# Patient Record
Sex: FEMALE | Race: WHITE | NOT HISPANIC OR LATINO | Employment: UNEMPLOYED | URBAN - METROPOLITAN AREA
[De-identification: names, ages, dates, MRNs, and addresses within clinical notes are randomized per-mention and may not be internally consistent; named-entity substitution may affect disease eponyms.]

---

## 2017-08-31 ENCOUNTER — CONVERSION ENCOUNTER (OUTPATIENT)
Dept: GENERAL RADIOLOGY | Facility: HOSPITAL | Age: 57
End: 2017-08-31

## 2018-03-06 ENCOUNTER — OFFICE VISIT CONVERTED (OUTPATIENT)
Dept: ONCOLOGY | Facility: HOSPITAL | Age: 58
End: 2018-03-06
Attending: INTERNAL MEDICINE

## 2018-03-06 ENCOUNTER — OFFICE VISIT CONVERTED (OUTPATIENT)
Dept: FAMILY MEDICINE CLINIC | Facility: CLINIC | Age: 58
End: 2018-03-06
Attending: FAMILY MEDICINE

## 2018-04-10 ENCOUNTER — OFFICE VISIT CONVERTED (OUTPATIENT)
Dept: FAMILY MEDICINE CLINIC | Facility: CLINIC | Age: 58
End: 2018-04-10
Attending: FAMILY MEDICINE

## 2018-04-10 ENCOUNTER — CONVERSION ENCOUNTER (OUTPATIENT)
Dept: FAMILY MEDICINE CLINIC | Facility: CLINIC | Age: 58
End: 2018-04-10

## 2018-04-18 ENCOUNTER — OFFICE VISIT CONVERTED (OUTPATIENT)
Dept: ORTHOPEDIC SURGERY | Facility: CLINIC | Age: 58
End: 2018-04-18
Attending: ORTHOPAEDIC SURGERY

## 2018-05-09 ENCOUNTER — OFFICE VISIT CONVERTED (OUTPATIENT)
Dept: ORTHOPEDIC SURGERY | Facility: CLINIC | Age: 58
End: 2018-05-09
Attending: ORTHOPAEDIC SURGERY

## 2018-05-15 ENCOUNTER — OFFICE VISIT CONVERTED (OUTPATIENT)
Dept: NEUROLOGY | Facility: CLINIC | Age: 58
End: 2018-05-15
Attending: PSYCHIATRY & NEUROLOGY

## 2018-08-28 ENCOUNTER — OFFICE VISIT CONVERTED (OUTPATIENT)
Dept: FAMILY MEDICINE CLINIC | Facility: CLINIC | Age: 58
End: 2018-08-28
Attending: FAMILY MEDICINE

## 2019-05-20 ENCOUNTER — HOSPITAL ENCOUNTER (OUTPATIENT)
Dept: URGENT CARE | Facility: CLINIC | Age: 59
Discharge: HOME | End: 2019-05-20

## 2019-05-22 LAB — BACTERIA UR CULT: NORMAL

## 2019-08-21 ENCOUNTER — OFFICE VISIT CONVERTED (OUTPATIENT)
Dept: FAMILY MEDICINE CLINIC | Facility: CLINIC | Age: 59
End: 2019-08-21
Attending: NURSE PRACTITIONER

## 2019-08-21 ENCOUNTER — HOSPITAL ENCOUNTER (OUTPATIENT)
Dept: FAMILY MEDICINE CLINIC | Facility: CLINIC | Age: 59
Discharge: HOME | End: 2019-08-21
Attending: FAMILY MEDICINE

## 2019-08-21 LAB
ALBUMIN SERPL-MCNC: 4.4 G/DL (ref 3.5–5)
ALBUMIN/GLOB SERPL: 1.3 {RATIO} (ref 1.4–2.6)
ALP SERPL-CCNC: 102 U/L (ref 53–141)
ALT SERPL-CCNC: 15 U/L (ref 10–40)
ANION GAP SERPL CALC-SCNC: 22 MMOL/L (ref 8–19)
AST SERPL-CCNC: 15 U/L (ref 15–50)
BASOPHILS # BLD AUTO: 0.06 10*3/UL (ref 0–0.2)
BASOPHILS NFR BLD AUTO: 0.5 % (ref 0–3)
BILIRUB SERPL-MCNC: 0.24 MG/DL (ref 0.2–1.3)
BUN SERPL-MCNC: 13 MG/DL (ref 5–25)
BUN/CREAT SERPL: 17 {RATIO} (ref 6–20)
CALCIUM SERPL-MCNC: 9.7 MG/DL (ref 8.7–10.4)
CHLORIDE SERPL-SCNC: 101 MMOL/L (ref 99–111)
CHOLEST SERPL-MCNC: 207 MG/DL (ref 107–200)
CHOLEST/HDLC SERPL: 3.1 {RATIO} (ref 3–6)
CONV ABS IMM GRAN: 0.07 10*3/UL (ref 0–0.2)
CONV CO2: 22 MMOL/L (ref 22–32)
CONV IMMATURE GRAN: 0.6 % (ref 0–1.8)
CONV TOTAL PROTEIN: 7.7 G/DL (ref 6.3–8.2)
CREAT UR-MCNC: 0.77 MG/DL (ref 0.5–0.9)
DEPRECATED RDW RBC AUTO: 42.5 FL (ref 36.4–46.3)
EOSINOPHIL # BLD AUTO: 0.23 10*3/UL (ref 0–0.7)
EOSINOPHIL # BLD AUTO: 2 % (ref 0–7)
ERYTHROCYTE [DISTWIDTH] IN BLOOD BY AUTOMATED COUNT: 13 % (ref 11.7–14.4)
EST. AVERAGE GLUCOSE BLD GHB EST-MCNC: 128 MG/DL
GFR SERPLBLD BASED ON 1.73 SQ M-ARVRAT: >60 ML/MIN/{1.73_M2}
GLOBULIN UR ELPH-MCNC: 3.3 G/DL (ref 2–3.5)
GLUCOSE SERPL-MCNC: 149 MG/DL (ref 65–99)
HBA1C MFR BLD: 6.1 % (ref 3.5–5.7)
HCT VFR BLD AUTO: 44.9 % (ref 37–47)
HDLC SERPL-MCNC: 66 MG/DL (ref 40–60)
HGB BLD-MCNC: 14 G/DL (ref 12–16)
LDLC SERPL CALC-MCNC: 108 MG/DL (ref 70–100)
LYMPHOCYTES # BLD AUTO: 3.01 10*3/UL (ref 1–5)
LYMPHOCYTES NFR BLD AUTO: 26.1 % (ref 20–45)
MCH RBC QN AUTO: 28.2 PG (ref 27–31)
MCHC RBC AUTO-ENTMCNC: 31.2 G/DL (ref 33–37)
MCV RBC AUTO: 90.3 FL (ref 81–99)
MONOCYTES # BLD AUTO: 0.59 10*3/UL (ref 0.2–1.2)
MONOCYTES NFR BLD AUTO: 5.1 % (ref 3–10)
NEUTROPHILS # BLD AUTO: 7.59 10*3/UL (ref 2–8)
NEUTROPHILS NFR BLD AUTO: 65.7 % (ref 30–85)
NRBC CBCN: 0 % (ref 0–0.7)
OSMOLALITY SERPL CALC.SUM OF ELEC: 295 MOSM/KG (ref 273–304)
PLATELET # BLD AUTO: 312 10*3/UL (ref 130–400)
PMV BLD AUTO: 11.8 FL (ref 9.4–12.3)
POTASSIUM SERPL-SCNC: 4.3 MMOL/L (ref 3.5–5.3)
RBC # BLD AUTO: 4.97 10*6/UL (ref 4.2–5.4)
SODIUM SERPL-SCNC: 141 MMOL/L (ref 135–147)
TRIGL SERPL-MCNC: 166 MG/DL (ref 40–150)
TSH SERPL-ACNC: 1.56 M[IU]/L (ref 0.27–4.2)
VLDLC SERPL-MCNC: 33 MG/DL (ref 5–37)
WBC # BLD AUTO: 11.55 10*3/UL (ref 4.8–10.8)

## 2020-09-01 ENCOUNTER — CONVERSION ENCOUNTER (OUTPATIENT)
Dept: FAMILY MEDICINE CLINIC | Facility: CLINIC | Age: 60
End: 2020-09-01

## 2020-09-01 ENCOUNTER — HOSPITAL ENCOUNTER (OUTPATIENT)
Dept: FAMILY MEDICINE CLINIC | Facility: CLINIC | Age: 60
Discharge: HOME | End: 2020-09-01
Attending: FAMILY MEDICINE

## 2020-09-01 ENCOUNTER — OFFICE VISIT CONVERTED (OUTPATIENT)
Dept: FAMILY MEDICINE CLINIC | Facility: CLINIC | Age: 60
End: 2020-09-01
Attending: FAMILY MEDICINE

## 2020-09-01 LAB
EST. AVERAGE GLUCOSE BLD GHB EST-MCNC: 140 MG/DL
HBA1C MFR BLD: 6.5 % (ref 3.5–5.7)

## 2020-09-02 LAB
ALBUMIN SERPL-MCNC: 4 G/DL (ref 3.5–5)
ALBUMIN/GLOB SERPL: 1.3 {RATIO} (ref 1.4–2.6)
ALP SERPL-CCNC: 102 U/L (ref 53–141)
ALT SERPL-CCNC: 20 U/L (ref 10–40)
ANION GAP SERPL CALC-SCNC: 19 MMOL/L (ref 8–19)
AST SERPL-CCNC: 20 U/L (ref 15–50)
BILIRUB SERPL-MCNC: 0.32 MG/DL (ref 0.2–1.3)
BUN SERPL-MCNC: 14 MG/DL (ref 5–25)
BUN/CREAT SERPL: 17 {RATIO} (ref 6–20)
CALCIUM SERPL-MCNC: 9.8 MG/DL (ref 8.7–10.4)
CHLORIDE SERPL-SCNC: 103 MMOL/L (ref 99–111)
CONV CO2: 23 MMOL/L (ref 22–32)
CONV TOTAL PROTEIN: 7.2 G/DL (ref 6.3–8.2)
CREAT UR-MCNC: 0.82 MG/DL (ref 0.5–0.9)
GFR SERPLBLD BASED ON 1.73 SQ M-ARVRAT: >60 ML/MIN/{1.73_M2}
GLOBULIN UR ELPH-MCNC: 3.2 G/DL (ref 2–3.5)
GLUCOSE SERPL-MCNC: 157 MG/DL (ref 65–99)
OSMOLALITY SERPL CALC.SUM OF ELEC: 296 MOSM/KG (ref 273–304)
POTASSIUM SERPL-SCNC: 4.2 MMOL/L (ref 3.5–5.3)
SODIUM SERPL-SCNC: 141 MMOL/L (ref 135–147)
TSH SERPL-ACNC: 2.15 M[IU]/L (ref 0.27–4.2)

## 2020-10-01 ENCOUNTER — TELEPHONE CONVERTED (OUTPATIENT)
Dept: FAMILY MEDICINE CLINIC | Facility: CLINIC | Age: 60
End: 2020-10-01
Attending: FAMILY MEDICINE

## 2021-04-27 ENCOUNTER — OFFICE VISIT CONVERTED (OUTPATIENT)
Dept: FAMILY MEDICINE CLINIC | Facility: CLINIC | Age: 61
End: 2021-04-27
Attending: FAMILY MEDICINE

## 2021-04-27 ENCOUNTER — HOSPITAL ENCOUNTER (OUTPATIENT)
Dept: FAMILY MEDICINE CLINIC | Facility: CLINIC | Age: 61
Discharge: HOME | End: 2021-04-27
Attending: FAMILY MEDICINE

## 2021-04-27 LAB
ALBUMIN SERPL-MCNC: 4.5 G/DL (ref 3.5–5)
ALBUMIN/GLOB SERPL: 1.3 {RATIO} (ref 1.4–2.6)
ALP SERPL-CCNC: 98 U/L (ref 53–141)
ALT SERPL-CCNC: 27 U/L (ref 10–40)
ANION GAP SERPL CALC-SCNC: 17 MMOL/L (ref 8–19)
AST SERPL-CCNC: 26 U/L (ref 15–50)
BILIRUB SERPL-MCNC: 0.33 MG/DL (ref 0.2–1.3)
BUN SERPL-MCNC: 15 MG/DL (ref 5–25)
BUN/CREAT SERPL: 17 {RATIO} (ref 6–20)
CALCIUM SERPL-MCNC: 9.9 MG/DL (ref 8.7–10.4)
CHLORIDE SERPL-SCNC: 101 MMOL/L (ref 99–111)
CONV CO2: 27 MMOL/L (ref 22–32)
CONV TOTAL PROTEIN: 7.9 G/DL (ref 6.3–8.2)
CREAT UR-MCNC: 0.88 MG/DL (ref 0.5–0.9)
EST. AVERAGE GLUCOSE BLD GHB EST-MCNC: 143 MG/DL
GFR SERPLBLD BASED ON 1.73 SQ M-ARVRAT: >60 ML/MIN/{1.73_M2}
GLOBULIN UR ELPH-MCNC: 3.4 G/DL (ref 2–3.5)
GLUCOSE SERPL-MCNC: 159 MG/DL (ref 65–99)
HBA1C MFR BLD: 6.6 % (ref 3.5–5.7)
OSMOLALITY SERPL CALC.SUM OF ELEC: 296 MOSM/KG (ref 273–304)
POTASSIUM SERPL-SCNC: 4 MMOL/L (ref 3.5–5.3)
SODIUM SERPL-SCNC: 141 MMOL/L (ref 135–147)
T4 FREE SERPL-MCNC: 1.4 NG/DL (ref 0.9–1.8)
TSH SERPL-ACNC: 1.3 M[IU]/L (ref 0.27–4.2)

## 2021-05-06 ENCOUNTER — HOSPITAL ENCOUNTER (OUTPATIENT)
Dept: FAMILY MEDICINE CLINIC | Facility: CLINIC | Age: 61
Discharge: HOME | End: 2021-05-06
Attending: FAMILY MEDICINE

## 2021-05-08 LAB — BACTERIA UR CULT: NORMAL

## 2021-05-13 NOTE — PROGRESS NOTES
Progress Note      Patient Name: Frida Huff   Patient ID: [id number]   Sex: Female   YOB: 1960    Primary Care Provider: Edel Woodson MD   Referring Provider: Edel Woodson MD    Visit Date: October 1, 2020    Provider: Edel Woodson MD   Location: [facility]   Location Address: [address] [place] [address], [address] [place] [address]  [id number]   Location Phone: [phone]          Chief Complaint  · 1 Month Follow Up      History Of Present Illness  TELEHEALTH TELEPHONE VISIT  Frida Huff is a 60 year old /White female who is presenting for evaluation via telehealth telephone visit. Verbal consent obtained before beginning visit.   Provider spent 25 minutes with patient during telehealth visit.   The following staff were present during this visit: Nancy Chavez   Past Medical History/Overview of Patient Symptoms     Chronic Pain- pt reports the tramadol does effect her ears.  Pt reports the gabapentin helps the pain between her shoulder blades feel better but she could not focus her eyes on anything. Pt reports pain is tolerable at this time.     Diabetes- pt is still taking her Jardiance and at this time refuses to go to any other specialist but her A1C was well controlled at 6.5 but I am concerned it is so well controlled because she is not eating well.     Eustachian tube disorderpatient reports that the ofloxacin drops that she had from her last appointment did not work well  Made her ears feel more clogged up.  Patient reports that after she finished using the eardrops her ears that she started to feel better but now feels at this time they clogged again.  I will be sending her prescription for nasal spray Flonase that she can use to hopefully decrease some of the inflammation in her nasal passages and may be releasing pressure from the eustachian tube.             Past Medical History  Adhesive capsulitis of left shoulder; Allergies; Anemia; Anxiety; Chest  pain; Colitis; Deafness; Depression; Diabetes; Diabetes mellitus, type 2; Essential hypertension; Hemorrhoids; Hypertension; Hypothyroidism; Kidney stones; MGUS (monoclonal gammopathy of unknown significance); Migraines; Night sweat; Numbness; Sinus trouble; SOB (shortness of breath); Thyroid disorder; Thyroid Problems         Past Surgical History  Appendectomy; heart surgery; Hysterectomy; Kidney; Kidney Stone Surgery, Unspecified         Medication List  Aspir-81 81 mg oral tablet,delayed release (DR/EC); clindamycin HCl 150 mg oral capsule; clobetasol 0.05 % topical cream; doxycycline monohydrate 100 mg oral capsule; estradiol 1 mg oral tablet; FreeStyle Lancets 28 gauge miscellaneous misc; FreeStyle Lite Meter miscellaneous kit; FreeStyle Lite Strips miscellaneous strip; levothyroxine 88 mcg oral tablet; lisinopril-hydrochlorothiazide 10-12.5 mg oral tablet; melatonin 1 mg oral tablet; metformin 500 mg oral tablet; ofloxacin 0.3 % otic (ear) drops; tramadol 50 mg oral tablet; Vitamin D3 5,000 unit oral tablet         Allergy List  Contrast media allergy; Latex Exam Gloves; levofloxacin; Neosporin; SULFA (SULFONAMIDES)         Family Medical History  Stroke; Heart Disease; Cancer, Unspecified; Diabetes, unspecified type; Diabetes Mellitus, Type II; Family history of certain chronic disabling diseases; arthritis         Reproductive History   0 Para 0 0 0 0       Social History  Alcohol Use; Homemaker.; .; Recreational Drug Use (Never); Tobacco (Never)         Review of Systems  · Constitutional  o Denies  o : fatigue, fever, weight loss, weight gain  · Eyes  o Denies  o : eye discomfort, eye pain  · HENT  o Admits  o : ear fullness  o Denies  o : vertigo, nasal congestion  · Genitourinary  o Denies  o : frequency, dysuria  · Integument  o Denies  o : rash, itching  · Neurologic  o Denies  o : muscular weakness, memory difficulties  · Musculoskeletal  o Denies  o : joint swelling, muscle  pain  · Psychiatric  o Denies  o : anxiety, depression  · Heme-Lymph  o Denies  o : lightheadedness, easy bleeding  · Allergic-Immunologic  o Admits  o : sinus allergy symptoms  o Denies  o : allergic dermatitis          Assessment  · Allergic rhinitis due to allergen     477.9/J30.9  · Diabetes mellitus, type 2     250.00/E11.9  · Chronic back pain       Dorsalgia, unspecified     724.5/M54.9  Other chronic pain     724.5/G89.29  · Eustachian tube disorder     381.9/H69.90      Plan  · Orders  o CMP [facility] ([id number]) - 250.00/E11.9 - 03/01/2021 - - (Future Order)  o Hgb A1c [facility] ([id number]) - 250.00/E11.9 - 03/01/2021 - - (Future Order)  o ACO-39: Current medications updated and reviewed (, 1159F) - - 10/01/2020  o Physician Telephone Evaluation, 21-30 minutes ([id number]) - 477.9/J30.9 - 10/01/2020  · Medications  o fluticasone propionate 50 mcg/actuation nasal spray,suspension   SIG: spray 1 - 2 sprays in each nostril by intranasal route once daily   DISP: (1) Bottle with 11 refills  Prescribed on 10/01/2020     o clobetasol 0.05 % topical ointment   SIG: apply a thin layer to the affected area(s) by topical route 3 times per day for 30 days   DISP: (1) Tube with 5 refills  Adjusted on 10/01/2020     o FreeStyle Lite Strips miscellaneous strip   SIG: check blood sugar TID   DISP: (100) Each with 11 refills  Adjusted on 10/01/2020     o Medications have been Reconciled  o Transition of Care or Provider Policy  · Instructions  o Plan Of Care:   o Chronic conditions reviewed and taken into consideration for today's treatment plan.  o Discussed Covid-19 precautions including, but not limited to, social distancing, avoid touching your face, and hand washing.   · Disposition  o Return Visit Request in/on 5 months +/- 0 days ([id number]).            Electronically Signed by: Edel Woodson MD -Author on October 1, 2020 04:18:57 PM

## 2021-05-13 NOTE — PROGRESS NOTES
Progress Note      Patient Name: Frida Huff   Patient ID: [id number]   Sex: Female   YOB: 1960    Primary Care Provider: Edel Woodson MD   Referring Provider: Edel Woodson MD    Visit Date: September 1, 2020    Provider: Edel Woodson MD   Location: [facility]   Location Address: [address] [place] [address], [address] [place] [address]  [id number]   Location Phone: [phone]          Chief Complaint  · Annual Physical Exam      History Of Present Illness  Frida Huff is a 60 year old /White female who presents for evaluation and treatment of:      Pt is here for her annual physical exam and has been lost to follow up for Diabetes.     Otitis Externa- Pt had a sinus infection from 6 weeks ago and did not go to the doctor and has blood coming out of her left ear.     Skin laceration-Pt had a cut on her left ankle from lancet she left on the bed.  That occurred about 6 weeks ago.     Dm type - highest number is 137.  Pt  reports that she has been suffering from low blood sugars with the lowest being seen in the 50s.  Patient has not been eating well and her  says that she does not monitor all and can go all day long barely eating anything.  Patient reports that she often forgets to eat because she is playing video games.  Patient reports that she did start to feel shaky now since she has not had much to eat since lunch.  I will be getting her labs today and according to her A1c numbers I will be adjusting her medications or even discontinuing her metformin.    Chronic painpatient has been suffering from chronic pain due to back and multiple health issues.  Patient only takes tramadol 1 or 2 days out of the month but reports that she is in constant pain every day with sharp stabbing pain in her back.  We discussed different options for pain control and taking the tramadol can sometimes make her little tired but it does work well for her pain.  Patient is  concerned about a possible affecting her hearing.  We discussed trying an alternative such as gabapentin 100 mg once a day relieving at night to see if that will help control her pain better start giving her drowsiness on long-term complications.  It is something that she is going to try and we will do a 1 month follow-up to see how well she still with her pain control.  Patient does not want to see multiple specialist for take action on her back problem at this time.    Skin rashpatient has a recurrent skin rash that the clobetasol cream seems to work well for her.  I will be giving her a refill for that.       Past Medical History  Adhesive capsulitis of left shoulder; Allergies; Anemia; Anxiety; Chest pain; Colitis; Deafness; Depression; Diabetes; Diabetes mellitus, type 2; Essential hypertension; Hemorrhoids; Hypertension; Hypothyroidism; Kidney stones; MGUS (monoclonal gammopathy of unknown significance); Migraines; Night sweat; Numbness; Sinus trouble; SOB (shortness of breath); Thyroid disorder; Thyroid Problems         Past Surgical History  Appendectomy; heart surgery; Hysterectomy; Kidney; Kidney Stone Surgery, Unspecified         Medication List  Aspir-81 81 mg oral tablet,delayed release (DR/EC); estradiol 1 mg oral tablet; FreeStyle Lancets 28 gauge miscellaneous misc; FreeStyle Lite Meter miscellaneous kit; FreeStyle Lite Strips miscellaneous strip; levothyroxine 88 mcg oral tablet; lisinopril-hydrochlorothiazide 10-12.5 mg oral tablet; melatonin 1 mg oral tablet; metformin 500 mg oral tablet; Vitamin D3 5,000 unit oral tablet         Allergy List  Contrast media allergy; Latex Exam Gloves; levofloxacin; Neosporin; SULFA (SULFONAMIDES)         Family Medical History  Stroke; Heart Disease; Cancer, Unspecified; Diabetes, unspecified type; Diabetes Mellitus, Type II; Family history of certain chronic disabling diseases; arthritis         Reproductive History   0 Para 0 0 0 0       Social  "History  Alcohol Use; Homemaker.; .; Recreational Drug Use (Never); Tobacco (Never)         Review of Systems  · Constitutional  o Denies  o : fatigue, fever, weight loss, weight gain  · Eyes  o Denies  o : eye discomfort, eye pain  · HENT  o Denies  o : vertigo, nasal congestion  · Cardiovascular  o Admits  o : irregular heart beats, lower extremity edema  · Respiratory  o Denies  o : shortness of breath, wheezing  · Gastrointestinal  o Denies  o : nausea, vomiting  · Genitourinary  o Denies  o : frequency, dysuria  · Integument  o Admits  o : rash, itching  · Neurologic  o Denies  o : muscular weakness, memory difficulties  · Musculoskeletal  o Admits  o : joint pain  o Denies  o : joint swelling, muscle pain  · Psychiatric  o Denies  o : anxiety, depression  · Heme-Lymph  o Denies  o : lightheadedness, easy bleeding  · Allergic-Immunologic  o Denies  o : sinus allergy symptoms, allergic dermatitis      Vitals  Date Time BP Position Site L\R Cuff Size HR RR TEMP (F) WT  HT  BMI kg/m2 BSA m2 O2 Sat        09/01/2020 05:38 /100 Sitting    107 - R 12 97.3 257lbs 0oz 5'  6\" 41.48 2.33 98 %          Physical Examination  · Constitutional  o Appearance  o : well-nourished, in no acute distress  · Eyes  o Conjunctivae  o : conjunctivae normal  o Sclerae  o : sclerae white  o Pupils and Irises  o : pupils equal, round, and reactive to light and accommodation bilaterally  o Eyelids/Ocular Adnexae  o : eyelid appearance normal, no exudates present  · Ears, Nose, Mouth and Throat  o Ears  o :   § External Ears  § : external auditory canal appearance within normal limits, no discharge present  § Otoscopic Examination  § : tympanic membrane appearance within normal limits bilaterally  o Nose  o :   § External Nose  § : appearance normal  § Nasopharynx  § : no discharge present  o Oral Cavity  o :   § Oral Mucosa  § : oral mucosa normal  § Lips  § : lip appearance normal  o Throat  o :   § Oropharynx  § : no " inflammation or lesions present, tonsils within normal limits  · Neck  o Range of Motion  o : cervical range of motion within normal limits  · Respiratory  o Respiratory Effort  o : breathing unlabored  o Inspection of Chest  o : normal appearance  o Auscultation of Lungs  o : normal breath sounds throughout  · Cardiovascular  o Heart  o :   § Auscultation of Heart  § : regular rate and rhythm, no murmurs, gallops or rubs  o Peripheral Vascular System  o :   § Extremities  § : 2+ edema present, no cyanosis, no distal hair loss, normal capillary refill  · Gastrointestinal  o Abdominal Examination  o : abdomen nontender to palpation, tone normal without rigidity or guarding, no masses present, bowel sounds present in all four quadrants  · Lymphatic  o Neck  o : no lymphadenopathy present  · Musculoskeletal  o Spine  o :   § Inspection/Palpation  § : no spinal tenderness, scoliosis or kyphosis present  § Stability  § : no subluxations present  § Range of Motion  § : spine range of motion normal  o Right Upper Extremity  o :   § Inspection/Palpation  § : no tenderness to palpation, ROM normal  o Left Upper Extremity  o :   § Inspection/Palpation  § : no tenderness to palpation, ROM normal  o Right Lower Extremity  o :   § Inspection/Palpation  § : no joint or limb tenderness to palpation, ROM normal  o Left Lower Extremity  o :   § Inspection/Palpation  § : no joint or limb tenderness to palpation, ROM normal  · Skin and Subcutaneous Tissue  o General Inspection  o : no rashes or lesions present, no areas of discoloration  o Body Hair  o : scalp palpation normal, hair normal for age, general body hair distribution normal for age  o Digits and Nails  o : no clubbing, cyanosis, deformities or edema present, normal appearing nails  · Neurologic  o Mental Status Examination  o :   § Orientation  § : grossly oriented to person, place and time  o Gait and Station  o : normal gait, able to stand without  difficulty  · Psychiatric  o Judgement and Insight  o : judgment and insight intact  o Mood and Affect  o : mood normal, affect appropriate          Assessment  · Diabetes mellitus, type 2     250.00/E11.9  · Essential hypertension     401.9/I10  · Hypothyroidism     244.9/E03.9  · Lumbago     724.2/M54.5  · Otitis externa     380.10/H60.90    Problems Reconciled  Plan  · Orders  o CMP [facility] ([id number]) - 250.00/E11.9 - 09/01/2020  o Hgb A1c [facility] ([id number]) - 250.00/E11.9 - 09/01/2020  o TSH [facility] ([id number]) - 244.9/E03.9 - 09/01/2020  o ACO-39: Current medications updated and reviewed () - - 09/01/2020  · Medications  o clobetasol 0.05 % topical cream   SIG: apply a thin layer to the affected area(s) by topical route 3 times per day for 30 days   DISP: (1) 60 gm tube with 2 refills  Prescribed on 09/01/2020     o tramadol 50 mg oral tablet   SIG: take 1 tablet by oral route 3 times a day as needed for 30 days   DISP: (90) tablets with 2 refills  Prescribed on 09/01/2020     o ofloxacin 0.3 % otic (ear) drops   SIG: instill 10 drops into left ear by otic route once daily for 10 days   DISP: (1) 5 ml drop btl with 0 refills  Prescribed on 09/01/2020     o lisinopril-hydrochlorothiazide 10-12.5 mg oral tablet   SIG: take 1 tablet by oral route once daily for 30 days   DISP: (30) tablets with 2 refills  Adjusted on 09/01/2020     o levothyroxine 88 mcg oral tablet   SIG: take 1 tablet (88 mcg) by oral route once daily for 90 days   DISP: (90) tablets with 1 refills  Refilled on 09/01/2020     o metformin 500 mg oral tablet   SIG: take 1 tablet (500 mg) by oral route 2 times per day with morning and evening meals for 90 days   DISP: (180) tablets with 1 refills  Refilled on 09/01/2020     o Medications have been Reconciled  o Transition of Care or Provider Policy  · Instructions  o Patient was educated/instructed on their diagnosis, treatment and medications prior to discharge from the clinic today.  o Minutes spent with patient  including greater than 50% in Education/Counseling/Care Coordination.  o Time spent with the patient was minutes, more than 50% face to face. 25 minutes  · Disposition  o Return Visit Request in/on 1 month +/- 0 days ([id number]).            Electronically Signed by: Edel Woodson MD -Author on September 1, 2020 05:40:24 PM

## 2021-05-14 VITALS
WEIGHT: 257 LBS | SYSTOLIC BLOOD PRESSURE: 155 MMHG | BODY MASS INDEX: 41.3 KG/M2 | DIASTOLIC BLOOD PRESSURE: 100 MMHG | TEMPERATURE: 97.3 F | RESPIRATION RATE: 12 BRPM | HEART RATE: 107 BPM | HEIGHT: 66 IN | OXYGEN SATURATION: 98 %

## 2021-05-14 VITALS
DIASTOLIC BLOOD PRESSURE: 88 MMHG | BODY MASS INDEX: 39.41 KG/M2 | HEIGHT: 66 IN | HEART RATE: 98 BPM | OXYGEN SATURATION: 96 % | WEIGHT: 245.25 LBS | SYSTOLIC BLOOD PRESSURE: 150 MMHG | TEMPERATURE: 98.2 F

## 2021-05-14 NOTE — PROGRESS NOTES
Progress Note      Patient Name: Frida Huff   Patient ID: [id number]   Sex: Female   YOB: 1960    Primary Care Provider: Edel Woodson MD   Referring Provider: Edel Woodson MD    Visit Date: April 27, 2021    Provider: Edel Woodson MD   [place] [facility] [place]   [place] Address: [address] [place] [address], [address] [place] [address]  [id number]   Location Phone: [phone]          Chief Complaint  · Diabetes Mellitus Type 2 follow up      History Of Present Illness  Frida Huff is a 60 year old /White female who presents for evaluation and treatment of:      Depression- patient reports that she did not want to do her depression screen since she is lost multiple family members with Covid recently in the last few months.  Pt refused to do it at this time.    Falls- pt reports she fell at home and has some pain in her hips.  Pt reports she did not go to the ER.     DM type 2- pt last Hgb A1C was 6.5 in Sept 2020 and that was well controlled.  pt doesn't think it will be as good since she has not been completely compliant with her diet and all the family and friends dying of COVID.  I will be rechecking her numbers today.     Pt has follow up coming up with Hematology.               Past Medical History  Adhesive capsulitis of left shoulder; Allergies; Anemia; Anxiety; Chest pain; Colitis; Deafness; Depression; Diabetes; Diabetes mellitus, type 2; Essential hypertension; Hemorrhoids; Hypertension; Hypothyroidism; Kidney Stones; MGUS (monoclonal gammopathy of unknown significance); Migraines; Night sweat; Numbness; Sinus trouble; SOB (shortness of breath); Thyroid disorder; Thyroid Problems         Past Surgical History  Appendectomy; heart surgery; Hysterectomy; Kidney; Kidney Stone Surgery, Unspecified         Medication List  Aspir-81 81 mg oral tablet,delayed release (DR/EC); clobetasol 0.05 % topical ointment; cranberry extract 500 mg oral tablet; estradiol 1 mg oral  "tablet; fluticasone propionate 50 mcg/actuation nasal spray,suspension; FreeStyle Lancets 28 gauge miscellaneous misc; FreeStyle Lite Meter miscellaneous kit; FreeStyle Lite Strips miscellaneous strip; levothyroxine 88 mcg oral tablet; lisinopril-hydrochlorothiazide 10-12.5 mg oral tablet; melatonin 1 mg oral tablet; metformin 500 mg oral tablet; tramadol 50 mg oral tablet; Vitamin D3 5,000 unit oral tablet         Allergy List  Contrast media allergy; Latex Exam Gloves; levofloxacin; Neosporin; SULFA (SULFONAMIDES)       Allergies Reconciled  Family Medical History  Stroke; Heart Disease; Cancer, Unspecified; Diabetes, unspecified type; Diabetes Mellitus, Type II; Family history of certain chronic disabling diseases; arthritis         Reproductive History   0 Para 0 0 0 0       Social History  Alcohol Use; Homemaker.; .; Recreational Drug Use (Never); Tobacco (Never)         Review of Systems  · Constitutional  o Denies  o : fatigue, fever, weight loss, weight gain  · Eyes  o Denies  o : eye discomfort, eye pain  · HENT  o Denies  o : vertigo, nasal congestion  · Cardiovascular  o Denies  o : chest pain, irregular heart beats  · Respiratory  o Denies  o : shortness of breath, wheezing  · Gastrointestinal  o Denies  o : nausea, vomiting  · Genitourinary  o Denies  o : frequency, dysuria  · Integument  o Denies  o : rash, itching  · Neurologic  o Denies  o : muscular weakness, memory difficulties  · Musculoskeletal  o Denies  o : joint swelling, muscle pain  · Psychiatric  o Denies  o : anxiety, depression  · Heme-Lymph  o Denies  o : lightheadedness, easy bleeding  · Allergic-Immunologic  o Denies  o : sinus allergy symptoms, allergic dermatitis      Vitals  Date Time BP Position Site L\R Cuff Size HR RR TEMP (F) WT  HT  BMI kg/m2 BSA m2 O2 Sat FR L/min FiO2        2021 02:48 /88 Sitting    98 - R  98.2 245lbs 4oz 5'  6\" 39.58 2.28 96 %            Physical " Examination  · Constitutional  o Appearance  o : well-nourished, in no acute distress  · Eyes  o Conjunctivae  o : conjunctivae normal  o Sclerae  o : sclerae white  o Pupils and Irises  o : pupils equal, round, and reactive to light and accommodation bilaterally  o Eyelids/Ocular Adnexae  o : eyelid appearance normal, no exudates present  · Ears, Nose, Mouth and Throat  o Ears  o :   § External Ears  § : external auditory canal appearance within normal limits, no discharge present  § Otoscopic Examination  § : tympanic membrane appearance within normal limits bilaterally  o Nose  o :   § External Nose  § : appearance normal  § Nasopharynx  § : no discharge present  o Oral Cavity  o :   § Oral Mucosa  § : oral mucosa normal  § Lips  § : lip appearance normal  o Throat  o :   § Oropharynx  § : no inflammation or lesions present, tonsils within normal limits  · Neck  o Inspection/Palpation  o : normal appearance, no masses or tenderness, trachea midline  o Range of Motion  o : cervical range of motion within normal limits  o Thyroid  o : gland size normal, nontender, no nodules or masses present on palpation  o Jugular Veins  o : JVP normal  · Respiratory  o Respiratory Effort  o : breathing unlabored  o Inspection of Chest  o : normal appearance  o Auscultation of Lungs  o : normal breath sounds throughout  · Cardiovascular  o Heart  o :   § Auscultation of Heart  § : regular rate and rhythm, no murmurs, gallops or rubs  o Peripheral Vascular System  o :   § Extremities  § : no edema  · Gastrointestinal  o Abdominal Examination  o : abdomen nontender to palpation, tone normal without rigidity or guarding, no masses present, bowel sounds present in all four quadrants  o Liver and spleen  o : no hepatomegaly present, liver nontender to palpation, spleen not palpable  o Hernias  o : no hernias present  · Lymphatic  o Neck  o : no lymphadenopathy present  · Musculoskeletal  o Spine  o :   § Inspection/Palpation  § : no  spinal tenderness, scoliosis or kyphosis present  § Stability  § : no subluxations present  § Range of Motion  § : spine range of motion normal  o Right Upper Extremity  o :   § Inspection/Palpation  § : no tenderness to palpation, ROM normal  o Left Upper Extremity  o :   § Inspection/Palpation  § : no tenderness to palpation, ROM normal  o Right Lower Extremity  o :   § Inspection/Palpation  § : no joint or limb tenderness to palpation, ROM normal  o Left Lower Extremity  o :   § Inspection/Palpation  § : no joint or limb tenderness to palpation, ROM normal  · Skin and Subcutaneous Tissue  o General Inspection  o : no rashes or lesions present, no areas of discoloration  o Body Hair  o : scalp palpation normal, hair normal for age, general body hair distribution normal for age  o Digits and Nails  o : no clubbing, cyanosis, deformities or edema present, normal appearing nails  · Neurologic  o Mental Status Examination  o :   § Orientation  § : grossly oriented to person, place and time  o Gait and Station  o : normal gait, able to stand without difficulty  · Psychiatric  o Judgement and Insight  o : judgment and insight intact  o Mood and Affect  o : mood normal, affect appropriate              Assessment  · Diabetes mellitus, type 2     250.00/E11.9  · Hip pain     719.45/M25.559  · Fall     E888.9/W19.XXXA      Plan  · Orders  o CMP [facility] ([id number]) - 250.00/E11.9 - 04/27/2021 - - (Standing Order 1 of 1 - occurring every 0 days)  o Hgb A1c [facility] ([id number]) - 250.00/E11.9 - 04/27/2021 - - (Standing Order 1 of 1 - occurring every 0 days)  o Thyroid Profile (19740, 64254, THYII) - 250.00/E11.9 - 04/27/2021  o ACO-39: Current medications updated and reviewed (, 1159F) - - 04/27/2021  o Screening for clinical depression documented, follow-up plan not documented, patient not eligible/appropriate () - - 04/27/2021   Patient refuses   o Hip (Right) 2 or more views (includes AP Pelvis) X-Ray [facility]. ([id number]) -  719.45/M25.559, E888.9/W19.XXXA - 04/27/2021  · Medications  o estradiol 1 mg oral tablet   SIG: take 1 tablet (1 mg) by oral route once daily for 30 days   DISP: (30) Tablet with 11 refills  Adjusted on 04/27/2021     o levothyroxine 88 mcg oral tablet   SIG: take 1 tablet by oral route daily for 30 days   DISP: (30) Tablet with 11 refills  Adjusted on 04/27/2021     o lisinopril-hydrochlorothiazide 10-12.5 mg oral tablet   SIG: take 1 tablet by oral route once daily for 30 days   DISP: (30) Tablet with 11 refills  Adjusted on 04/27/2021     o melatonin 1 mg oral tablet   SIG: take 1 tablet by oral route once a day (at bedtime) for 30 days   DISP: (30) Tablet with 11 refills  Adjusted on 04/27/2021     o metformin 500 mg oral tablet   SIG: take 1 tablet (500 mg) by oral route 2 times per day with morning and evening meals for 30 days   DISP: (60) Tablet with 11 refills  Adjusted on 04/27/2021     o clobetasol 0.05 % topical ointment   SIG: apply a thin layer to the affected area(s) by topical route 3 times per day for 30 days   DISP: (1) Tube with 5 refills  Refilled on 04/27/2021     o fluticasone propionate 50 mcg/actuation nasal spray,suspension   SIG: spray 1 - 2 sprays in each nostril by intranasal route once daily   DISP: (1) Bottle with 11 refills  Refilled on 04/27/2021     o FreeStyle Lite Strips miscellaneous strip   SIG: check blood sugar TID   DISP: (100) Each with 11 refills  Refilled on 04/27/2021     o Medications have been Reconciled  o Transition of Care or Provider Policy  · Instructions  o Patient was educated/instructed on their diagnosis, treatment and medications prior to discharge from the clinic today.  o Minutes spent with patient including greater than 50% in Education/Counseling/Care Coordination.  o Time spent with the patient was minutes, more than 50% face to face. 35 minutes  · Disposition  o Return Visit Request in/on 6 months +/- 0 days ([id number]).            Electronically Signed by:  Edel Woodson MD -Author on May 3, 2021 01:39:45 PM

## 2021-05-15 VITALS
HEIGHT: 66 IN | HEART RATE: 98 BPM | OXYGEN SATURATION: 98 % | WEIGHT: 256 LBS | BODY MASS INDEX: 41.14 KG/M2 | DIASTOLIC BLOOD PRESSURE: 84 MMHG | SYSTOLIC BLOOD PRESSURE: 138 MMHG | RESPIRATION RATE: 16 BRPM

## 2021-05-16 VITALS — OXYGEN SATURATION: 96 % | WEIGHT: 236.12 LBS | BODY MASS INDEX: 37.95 KG/M2 | HEIGHT: 66 IN | HEART RATE: 101 BPM

## 2021-05-16 VITALS
SYSTOLIC BLOOD PRESSURE: 144 MMHG | RESPIRATION RATE: 16 BRPM | TEMPERATURE: 97.9 F | OXYGEN SATURATION: 97 % | WEIGHT: 239 LBS | HEIGHT: 68 IN | DIASTOLIC BLOOD PRESSURE: 88 MMHG | HEART RATE: 110 BPM | BODY MASS INDEX: 36.22 KG/M2

## 2021-05-16 VITALS
HEART RATE: 116 BPM | TEMPERATURE: 98.3 F | RESPIRATION RATE: 20 BRPM | HEIGHT: 66 IN | DIASTOLIC BLOOD PRESSURE: 78 MMHG | SYSTOLIC BLOOD PRESSURE: 130 MMHG | WEIGHT: 236.5 LBS | OXYGEN SATURATION: 97 % | BODY MASS INDEX: 38.01 KG/M2

## 2021-05-16 VITALS
DIASTOLIC BLOOD PRESSURE: 50 MMHG | HEART RATE: 120 BPM | HEIGHT: 66 IN | WEIGHT: 231 LBS | BODY MASS INDEX: 37.12 KG/M2 | SYSTOLIC BLOOD PRESSURE: 107 MMHG

## 2021-05-16 VITALS
BODY MASS INDEX: 35.77 KG/M2 | HEART RATE: 110 BPM | TEMPERATURE: 98.4 F | HEIGHT: 68 IN | DIASTOLIC BLOOD PRESSURE: 86 MMHG | SYSTOLIC BLOOD PRESSURE: 151 MMHG | WEIGHT: 236 LBS | OXYGEN SATURATION: 97 % | RESPIRATION RATE: 16 BRPM

## 2021-05-16 VITALS — BODY MASS INDEX: 38.13 KG/M2 | OXYGEN SATURATION: 98 % | HEART RATE: 89 BPM | WEIGHT: 237.25 LBS | HEIGHT: 66 IN

## 2021-05-28 VITALS
WEIGHT: 236.11 LBS | HEART RATE: 97 BPM | OXYGEN SATURATION: 98 % | HEIGHT: 64 IN | TEMPERATURE: 96.8 F | SYSTOLIC BLOOD PRESSURE: 135 MMHG | DIASTOLIC BLOOD PRESSURE: 73 MMHG | BODY MASS INDEX: 40.31 KG/M2

## 2021-05-28 NOTE — PROGRESS NOTES
Patient: EDOUARD BELL     Acct: [id number]     Report: #[id number]  UNIT #: [id number]     : 1960    Encounter Date:2018  PRIMARY CARE: NAIMA SPARKS  ***Signed***  --------------------------------------------------------------------------------------------------------------------  Chief Complaint      Mar 6, 2018      [facility]      CAN'T MOVE LEFT ARM VERY MUCH EVER SINCE SHE HAD THE CT SCAN IN NOVEMBER.  SHE     WAS ALLERGIC TO THE DYE, BUT THEY WENT AHEAD AND DONE THE SCAN... HAVING AN     INSTANT UTI TODAY. BUT FEELING FATIGUE TODAY.            Current Plan      -Monoclonal gammopathy of undetermined significance.      -Patient had a serum protein electrophoresis with immunofixation.  Monoclonal     spike is 0.5.  Free lambda light chain is 20.2.      -Discussed with patient that we will need to workup for possible underlying     multiple myeloma including non-secretory myeloma.      -Bone survey to check for any lytic lesions was normal      -Discussed with patient that if she remains at [facility].  She has a 1% chance per     year of progression to multiple myeloma.      -Continue observation            -Left upper extremity swelling      -Patient's left upper extremity swelling is concerning for a DVT.      -Orders placed for Doppler ultrasound to rule out DVT            -Interval Clinic Visit Changes      -Bone Marrow confirms [facility], reviewed pathology, plan and monitoring.       -Continue observation.             -Right groin lymphadenopathy      -Patient with previous heart cath using the right groin in .       -Several episodes of enlarged lymph nodes in the right groin area with previous     and recent infections.      -Patient reports of drenching night sweats, intentional weight loss, and recent     onset of fevers with antibotic treatment with Cephalexin 500 mg 2 tabs BID x 14     days. Completed on 2017.       -Flow cytometry did not show any abnormalities.      -On  CT Right groin lymph nodes does not appear malignant.  There is a fatty     hilum.              -Type II Diabetes      - Hemoglobin A1C of 5.1 in Jessica      - Patient is Metformin.       - Follows with PCP.             -Screening Mammogram      -Patient has not had a screening mammogram in 2 years.       -Reports left breast tenderness.       -Bilateral mammogram normal            -Today's Evaluation      -Patient's imaging exams, blood tests, physicians' notes, and any new findings     since our last visit were reviewed today to reassess patient's medical     treatment plan. .       -Old medical records were re-reviewed and re-summarized in chronological order     in the HPI today to maintain an updated medical record.       -Patient's radiology imaging tests from our last visit were reviewed     independently by me by direct visualization of the images.        -Patients current lab tests and medications were carefully reviewed to evaluate     patient's current treatment plan today.       -Patient was advised to call us right away if there are any new symptoms for an     urgent visit for further evaluation. Patient voiced understanding and agreed to     do so.      [facility] (monoclonal gammopathy of unknown significance) - D47.2            Notes      New Medications      * Resveratrol 250 MG CAPSULE: 250 MG PO QDAY 30 Days #30      Discontinued Medications      * Doxycycline Hyclate (Doxycycline Hyclate*) 100 MG CAPSULE: 100 MG PO BID #20      New Diagnostics      * LDH, As Soon As Possible       Dx: [facility] (monoclonal gammopathy of unknown significance) - D47.2      * Sed Rate, As Soon As Possible       Dx: [facility] (monoclonal gammopathy of unknown significance) - D47.2      * D-Dimer Quantitative, As Soon As Possible       Dx: [facility] (monoclonal gammopathy of unknown significance) - D47.2      * Duplex Scan LE, SCHEDULED PROCEDURE       Dx: [facility] (monoclonal gammopathy of unknown significance) - D47.2      Time Spent:  > 50%  /Coord Care      Patient Education Provided:  Yes            History and Present Illness      Mrs. Frida Huff is a 57 year old  female who presents to     oncology for evaluation for ongoing and recent episode of lymphadenitis.     Patient reports having had a heart catheterization in 2012 and since then has     had several episodes of fevers, enlarged lymph nodes in the right groin area     with antibotic treatment. Most recent antibotic treatment was with Keflex, 500     mg, 2 tabs for 14 days which she finished treatment on July 25, 2017. The     patient reports an episode of sepsis in the past. She reports drenching night     sweats at least 2 times a week, intentional weight loss of 65 pounds in 2016     after being diagnosed with Diabetes. She reports recent fevers in June of     103.7. She reports ongoing fatigue, but relates to her insomnia only sleeping     about 3 hours a nite. She denies any alcohol or tobacco use. The patient is a     haphaphobic.             -June 14, 2017. WBC 10.30, Hemeglobin 14.90, Platelet count 294,000.     Differential WNL.       -August 2-2017.  Serum protein electrophoresis and immunofixation shows a     monoclonal spike.  M protein is 0.5.  Free lambda light chain is 20.2.      -November 27, 2017. Bone marrow pathology -  Bone marrow (core biopsy, clot     section, and aspirate smears):Plasma cell dyscrasia. 3% plasma cells counted on     aspirate smear       Peripheral blood (CBC and smear): - Minimal leukocytosis. The following tests     were performed at [facility] laboratory on the bone marrowaspirate.  See separate     [facility] laboratory reports for additional information. Flow cytometry:  CD56     positive monoclonal plasma cell population (approximately 1% of the leukocytes)     indicative of a plasma cell dyscrasia. Multiple myeloma chromosome analysis FISH    : Abnormal FISH results t(4;14) (p16;q32) (FGFR3;IGH): translocation present     14q32 (IGH):  gain present      14q32 (3'IGH): partial deletion present. Normal FISH results 1q21 (CKS1B):      gain not detected 9q34 (ASS1):  gain not detected 11q13 (CCND1):  gain/    rearrangement not detected t (11;14) (q13;q32) (CCND1;IGH):  translocation not     detected      t (14;16) (q32;q23) (IGH;MAF):  translocation not detected 15q24 (PML):  gain     not detected 17p13 (TP53):  deletion not detected Chromosome analysis:  46,XX(20    ) JAC1:JAC1      MICROSCOPIC DESCRIPTION: Core biopsy demonstrates a normocellular marrow for     age (approximately 50%) with maturing trilineage hematopoiesis.  Megakaryocytes     are normal in number and morphology.  Clot sections demonstrate many spicules     with no additional findings. No granulomas are identified.  No metastatic     carcinoma is identified.Aspirate smears demonstrate scattered spicules with     trilineage hematopoiesis.  Plasma cells are not increased.  No dysplasia is     identified.  Immunoperoxidase stains,performed on core biopsy, yielded the     following results:  Approximately 5%  positive plasma cells which also     express CD56.  Iron stain, performed on aspirate smear,core biopsy, and clot     sections, yielded the following results:  1-2+ iron. Dx:  [facility], multiple     myeloma.            Vitals      Height 5 ft 4.25 in / 163.2 cm      Weight 236 lbs 1.803 oz / 107.1 kg      BSA 2.26 m2      BMI 40.2 kg/m2      Temperature 96.8 F / 36 C - Temporal      Pulse 97      Blood Pressure 135/73 Sitting, Right Arm      Pulse Oximetry 98%, ROOM AIR            Allergies      Coded Allergies:             LATEX (Verified  Allergy, Severe, HIVES, 3/6/18)           VENOM-HONEY BEE (Verified  Allergy, Severe, ANAPHALAX, 3/6/18)           SULFA (SULFONAMIDE ANTIBIOTICS) (Verified  Allergy, Unknown, HIVESS, 3/6/18    )            Medications      Last Reconciled on 3/6/18 20:35 by EFFIE HALL MD      Resveratrol (Resveratrol) 250 Mg Capsule      250 MG PO QDAY  for 30 Days, #30 CAP         Reported         3/6/18       (Estradiol) (*Estradiol)   No Conflict Check      1 MG PO QDAY         Prov: Bryan Hogue         9/5/17       Melatonin (Melatonin) 1 Mg Tablet      0.5 MG PO HS, TAB         Reported         8/1/17       Cholecalciferol (Vitamin D3) 5,000 Unit Capsule      5000 UNITS PO QDAY for 30 Days, #30 CAP         Reported         8/1/17       Multivitamin (Multivitamins) 1 Each Capsule      0.5 EACH PO QDAY, CAP         Reported         8/1/17       Aspirin EC (ASPIRIN EC) 80 Mg Tablet.dr      81 MG PO QDAY, #30 TAB.SR 0 Refills         Reported         8/1/17       Lisinopril* (Lisinopril*) 10 Mg Tablet      5 MG PO BID, #30 TAB 0 Refills         Reported         8/1/17       Metformin Hcl (Metformin ER*) 500 Mg Tab.er.24      500 MG PO BID, #30 TAB.ER 0 Refills         Reported         8/1/17       Levothyroxine Sodium (Levoxyl*) 0.088 Mg Tablet      0.088 MG PO QDAY@07, #30 TAB 0 Refills         Reported         8/1/17       Clobetasol Propionate (Clobetasol 0.05%) Unknown Strength Shampoo      TOPICAL ASDIR, BOTTLE         Reported         8/1/16            General Information      Provider Not Found in Lookup:  NAIMA SPARKS            Pain and Fatigue Scales      Fatigue:           Experiencing any fatigue?:  Yes         Fatigue (0-10 scale):  7            Chemo Status      On Oral Chemotherapy?:  No            Other      Clinical Trial Participant?:  No            Past Medical History      No Diabetes Type 1, Yes Diabetes Type 2, Yes Thyroid Disease, No COPD, No     Emphysema, Yes Hypertension, No Stroke, No High Cholesterol, No Heart Attack,     No Bleeding Condition, No Low or High RBC Count, No Low or High WBC Count, No     Low or High Platelet Coun, No Hepatitis, Yes Kidney Disease, No Depression, No     Alzheimer's Disease, Yes Mental Disease (haphephobic), No Seizures, No Arthritis    , No Osteoporosis, No Osteopenia, No Short of Air, No Sleep apnea,  No Liver     Disease, No STD, No Enlarged Prostate, Yes Other (mother took PRESTON during     pregnany)      Hematology/oncology:  REPORTS HX OF: Other hematologic history (enlarged lymph     nodes to the right groin area. ), DENIES HX OF: Previous Treatment for CA,     Anemia, Bladder Cancer, Blood cancer, Brain cancer, Breast cancer, Cervical     cancer, Coagulopathy, Colorectal cancer, Endocrine cancer, Eye cancer, GI cancer    ,  cancer, Kidney cancer, Leukemia, Leukocytosis, Leukopenia, Liver cancer,     Lung cancer, Lymphoma, Musculoskeletal cancer, Myeloma, Neurologic cancer, Oral     cancer, Ovarian cancer, Skin cancer, Stomach cancer, Thrombocytopenia, Thyroid     cancer, Uterine cancer, Other cancer history      Genetic/metabolic:  DENIES HX OF: Cystic fibrosis, Down syndrome, Other genetic     history, Other metabolic history            Past Surgical History      REPORTS HX OF: Appendectomy, Hysterectomy, Other Past Surgical Hx (endometriosis    ), DENIES HX OF: Cataract extraction, Thyroid surgery, Lung biopsy, CABG surgery    , Coronary stent, Valve replacement, Cholecystectomy, Splenectomy, Bladder     surgery, Nephrectomy, Joint replacement, Frature repair, Skin cancer removal,     Melanoma excision, Spinal surgery, Breast biopsy, Lumpectomy, Mastectomy,     bilateral, Mastectomy, right, Mastectomy, left, Peg Tube Placement, VAD     Placement, Biopsy            Family History      REPORTS HX OF: Breast cancer (aunts), Colorectal cancer (grandmother), Kidney     Cancer (brother), Lung cancer (uncles), Skin Cancer (uncles), Other Cancer     History (brother bladder a), DENIES HX OF: Anemia, Blood disorders, Blood Cancer    , Cervical cancer, Coagulopathy, Endocrine Cancer, Eye Cancer, GI Cancer,      Cancer, Leukemia, Leukocytosis, Leukopenia, Liver Cancer, Lymphoma, Melanoma,     Musculoskeletal Cancer, Myeloma, Neurologic Cancer, Oral Cancer, Ovarian cancer    , Prostate cancer, Stomach Cancer,  Testicular Cancer, Thrombocytopenia, Thyroid     cancer, Uterine cancer, Other Hematology History            Social History      Yes      dara writer            Tobacco Use      Tobacco status:  Never smoker            Alcohol Use      Alcohol intake:  None            Substance Use      Substance use:  Denies use            ROS      General:  Complains of: Fatigue, Denies: Appetite change, Excessive sweating,     Fever, Night sweats, Weight gain, Weight loss, Other      Eyes:  Denies: Blurred vision, Corrective lenses, Diplopia, Eye irritation, Eye     pain, Eye redness, Spots in vision, Vision loss, Other      Ears, nose, mouth, throat:  Denies: Headache, Seizures, Visual Changes, Hearing     loss, Sinus Congestion, Hoarseness, Sore throat, Other      Cardiovascular:  Denies: Chest pain, Irregular heartbeat, Palpitations, Swollen     ankles/legs, Other      Respiratory:  Denies: Chest pain, Shortness of Air, Productive cough, Coughing     blood, Other      Gastrointestinal:  Denies: Nausea, Vomiting, Problem swallowing, Frequent     heartburn, Constipation, Diarrhea, Tarry stools, Bloody stools, Unable to     control bowels, Other      Kidney/Bladder:  Denies: Painful Urination, Change in urinary stream, Blood in     urine, Incontinence, Frequent Urination, Decreased urine stream, Other      Musculoskeletal:  Denies: New Back pain, Leg Cramps, Painful Joints, Swollen     Joints, Muscle Pain, Muscle weakness, Other      Skin:  DENIES: Jaundice, Easy Bleeding, Lesions/changes in moles, Nail changes,     Skin Discoloration, Rash, Other      Neurological:  Denies: Dizziness, Fainting, Numbness\Tingling, Paralysis,     Seizures, Other      Psychiatric:  Complains of: AAO X 3, Denies: Anxiety, Panic attacks, Depression    , Memory loss, Other      Endocrine:  DiabetesThyroid DisorderOsteoporosisEndocrine Other      Hematologic/lymphatic:  Denies: Bruising, Bleeding, Enlarged Lymph Nodes,     Recurrent infections, Other       Reproductive:  Denies Pregnant, Denies Menopause, Denies Still Menstruating,     Denies Heavy Periods, Denies Other            General Appearance:  Alert, Oriented X3, Cooperative, No acute distress      Eyes:  Anicteric Sclerae, Moist Conjunctiva, PERRLA      HEENT:  Orophraynx clear, No Erythema, No Exudates, No Pallor      Neck:  Supple, Full ROM, No Masses or JVD      Respiratory:  CTAB, No Diminished Breath, No Rales, No Crackels, No Rhonchi      Breast\Chest:  Symmetrical, Lumps (left breast tenderness )      Abdomen\Gastro:  Soft, No NABS, No Hepatosplenomegaly      Cardio:  RRR, No Murmur, No, Peripheral Edema      Skin:  Normal Temperature, Normal Tone, Normal Texture and Turgor, No     Subcutaneous Nodules, No Rash, lesions, ulcers      Psychiatric:  Appropriate Affect, Intact Judgement, AAO x3      Neuro:  Cranial Nerve II-XII Inta, No Focal Sensory Deficit      Genitourinary:  No Flores Catheter, No Bladder Distention      Muscularskeletal:  Normal Gait and Station, Full ROM of extremeties, Full     muscle strength\tone      Extremities:  No Digital Cyanosis, No Digital Ischemia, Pedal Pulses Intact,     Pedal Pulses Symetrical, Normal Gait and station      Lymphatic:  No Cervical, No Supraclavicular, No Infraclavicular, No Axillary,     No Inguinal            Lab Results      Laboratory Tests      2/27/18 14:27            Hx Influenza Vaccination:  No      Influenza Vaccine Declined:  No      2 or More Falls Past Year?:  No      Fall Past Year with Injury?:  No      Hx Pneumococcal Vaccination:  Yes      Encouraged to follow-up with:  PCP regarding preventative exams.      Chart initiated by      ALDA SILVA CMA            Total time spent with patient was 25 minutes of which 15 minutes was spent     discussing the patient's diagnosis. During this time we had a face-to-face     counseling and coordination of care with the patient. We also discussed the     medical diagnosis and current treatment  plan.  Patient was seen in clinic,     physical exam was performed, lab and imaging tests were noted, and above     medical documentation was also done by me.              Bryan Hogue MD FACP      Board Certified Hematologist      Board Certified Medical Oncologist                 Disclaimer: Converted document may not contain table formatting or lab diagrams. Please see [email or web address] for the authenticated document.

## 2021-06-08 DIAGNOSIS — N30.10 INTERSTITIAL CYSTITIS: Primary | ICD-10-CM

## 2021-06-08 DIAGNOSIS — R30.0 DYSURIA: ICD-10-CM

## 2021-07-11 PROBLEM — F41.9 ANXIETY: Status: ACTIVE | Noted: 2021-07-11

## 2021-07-11 PROBLEM — I10 HYPERTENSION: Status: ACTIVE | Noted: 2021-07-11

## 2021-07-11 PROBLEM — J34.9 SINUS TROUBLE: Status: ACTIVE | Noted: 2021-07-11

## 2021-07-11 PROBLEM — E11.9 DIABETES: Status: ACTIVE | Noted: 2021-07-11

## 2021-07-11 PROBLEM — D64.9 ANEMIA: Status: ACTIVE | Noted: 2021-07-11

## 2021-07-11 PROBLEM — E11.9 DIABETES MELLITUS, TYPE 2 (HCC): Status: ACTIVE | Noted: 2019-08-21

## 2021-07-11 PROBLEM — I10 ESSENTIAL HYPERTENSION: Status: ACTIVE | Noted: 2019-08-21

## 2021-07-11 PROBLEM — D47.2 MGUS (MONOCLONAL GAMMOPATHY OF UNKNOWN SIGNIFICANCE): Status: ACTIVE | Noted: 2021-07-11

## 2021-07-11 PROBLEM — H91.90 DEAFNESS: Status: ACTIVE | Noted: 2021-07-11

## 2021-07-11 PROBLEM — T78.40XA ALLERGIES: Status: ACTIVE | Noted: 2021-07-11

## 2021-07-11 PROBLEM — E03.9 HYPOTHYROIDISM: Status: ACTIVE | Noted: 2019-08-21

## 2021-07-11 PROBLEM — M75.02 ADHESIVE CAPSULITIS OF LEFT SHOULDER: Status: ACTIVE | Noted: 2018-04-18

## 2021-07-11 PROBLEM — R20.0 NUMBNESS: Status: ACTIVE | Noted: 2018-05-15

## 2021-07-11 PROBLEM — G43.909 MIGRAINES: Status: ACTIVE | Noted: 2021-07-11

## 2021-08-03 RX ORDER — ESTRADIOL 1 MG/1
1 TABLET ORAL DAILY
COMMUNITY
Start: 2021-05-26 | End: 2022-05-24 | Stop reason: SDUPTHER

## 2021-08-03 RX ORDER — ASPIRIN 81 MG/1
TABLET ORAL
COMMUNITY

## 2021-08-03 RX ORDER — DIPHENOXYLATE HYDROCHLORIDE AND ATROPINE SULFATE 2.5; .025 MG/1; MG/1
TABLET ORAL
COMMUNITY

## 2021-08-03 RX ORDER — UREA 10 %
1 LOTION (ML) TOPICAL
COMMUNITY
Start: 2021-05-27 | End: 2022-05-24 | Stop reason: SDUPTHER

## 2021-08-03 RX ORDER — LEVOTHYROXINE SODIUM 88 UG/1
TABLET ORAL
COMMUNITY
Start: 2021-05-26 | End: 2022-05-24 | Stop reason: SDUPTHER

## 2021-08-03 RX ORDER — LISINOPRIL AND HYDROCHLOROTHIAZIDE 12.5; 1 MG/1; MG/1
TABLET ORAL
COMMUNITY
Start: 2021-05-26 | End: 2022-05-24 | Stop reason: SDUPTHER

## 2021-08-03 RX ORDER — BLOOD-GLUCOSE METER
KIT MISCELLANEOUS 3 TIMES DAILY
COMMUNITY
Start: 2021-05-26

## 2021-08-03 RX ORDER — CLOBETASOL PROPIONATE 0.5 MG/G
OINTMENT TOPICAL
COMMUNITY
Start: 2021-05-26

## 2021-08-03 RX ORDER — FLUTICASONE PROPIONATE 50 MCG
SPRAY, SUSPENSION (ML) NASAL
COMMUNITY
Start: 2021-05-26

## 2021-08-04 ENCOUNTER — OFFICE VISIT (OUTPATIENT)
Dept: UROLOGY | Facility: CLINIC | Age: 61
End: 2021-08-04

## 2021-08-04 VITALS
DIASTOLIC BLOOD PRESSURE: 109 MMHG | BODY MASS INDEX: 40.82 KG/M2 | HEIGHT: 66 IN | SYSTOLIC BLOOD PRESSURE: 145 MMHG | WEIGHT: 254 LBS

## 2021-08-04 DIAGNOSIS — N30.20 CHRONIC CYSTITIS: ICD-10-CM

## 2021-08-04 DIAGNOSIS — R31.0 GROSS HEMATURIA: Primary | ICD-10-CM

## 2021-08-04 LAB
BILIRUB BLD-MCNC: NEGATIVE MG/DL
CLARITY, POC: CLEAR
COLOR UR: YELLOW
GLUCOSE UR STRIP-MCNC: NEGATIVE MG/DL
KETONES UR QL: NEGATIVE
LEUKOCYTE EST, POC: NEGATIVE
NITRITE UR-MCNC: NEGATIVE MG/ML
PH UR: 5.5 [PH] (ref 5–8)
PROT UR STRIP-MCNC: NEGATIVE MG/DL
RBC # UR STRIP: NEGATIVE /UL
SP GR UR: 1.03 (ref 1–1.03)
UROBILINOGEN UR QL: NORMAL

## 2021-08-04 PROCEDURE — 99203 OFFICE O/P NEW LOW 30 MIN: CPT | Performed by: UROLOGY

## 2021-08-04 PROCEDURE — 81003 URINALYSIS AUTO W/O SCOPE: CPT | Performed by: UROLOGY

## 2021-08-04 NOTE — PROGRESS NOTES
"Chief Complaint  Cystitis (dysuria New)    Subjective          Frida Huff presents to [facility]  Patient has self diagnosed recurrent UTIs although she states for the past year she has been scared to go the doctor and so she is just been treating her UTIs with lots of water and AZO over-the-counter.  She uses test trips at home to determine if she has an infection.  She is only had one culture for the past year which was negative.  Her symptoms include dysuria frequency and urgency and sometimes gross hematuria.  She states sometimes her infections get bad enough and go up to her kidneys.    She states she is concerned about multiple myeloma and that recurrent UTIs are a symptom of that.  She states she was diagnosed with early multiple myeloma.  She is also concerned because her brother  of bladder cancer.        Objective   Vital Signs:   BP (!) 145/109   Ht 167.6 cm (66\")   Wt 115 kg (254 lb)   BMI 41.00 kg/m²     Physical Exam  Vitals and nursing note reviewed.   Constitutional:       Appearance: Normal appearance. She is well-developed.   Pulmonary:      Effort: Pulmonary effort is normal.      Breath sounds: Normal air entry.   Neurological:      Mental Status: She is alert and oriented to person, place, and time.      Motor: Motor function is intact.   Psychiatric:         Mood and Affect: Mood normal.         Behavior: Behavior normal.        Result Review :                  Results for orders placed or performed in visit on 21   POC Urinalysis Dipstick, Automated    Specimen: Urine   Result Value Ref Range    Color Yellow Yellow, Straw, Dark Yellow, Kathryn    Clarity, UA Clear Clear    Specific Gravity  1.030 1.005 - 1.030    pH, Urine 5.5 5.0 - 8.0    Leukocytes Negative Negative    Nitrite, UA Negative Negative    Protein, POC Negative Negative mg/dL    Glucose, UA Negative Negative, 1000 mg/dL (3+) mg/dL    Ketones, UA Negative Negative    Urobilinogen, UA " Normal Normal    Bilirubin Negative Negative    Blood, UA Negative Negative     Assessment and Plan    Diagnoses and all orders for this visit:    1. Gross hematuria (Primary)  -     POC Urinalysis Dipstick, Automated    2. Chronic cystitis  Assessment & Plan:  She understands that she will need a urine culture with each perceived infection.  I will see her back in 3 to 4 months which hopefully will have some cultures to look at by that time.  We did discuss that we need to determine whether her symptoms are due to a persistent infection which is not being treated fully, a different infection each time, or if she is having the symptoms of an infection but not actually bacteria in her urine.  She understands this.      I spent 30 minutes caring for Frida on this date of service. This time includes time spent by me in the following activities:preparing for the visit, reviewing tests, performing a medically appropriate examination and/or evaluation , counseling and educating the patient/family/caregiver and documenting information in the medical record     Follow Up   No follow-ups on file.  Patient was given instructions and counseling regarding her condition or for health maintenance advice. Please see specific information pulled into the AVS if appropriate.

## 2021-08-04 NOTE — ASSESSMENT & PLAN NOTE
She understands that she will need a urine culture with each perceived infection.  I will see her back in 3 to 4 months which hopefully will have some cultures to look at by that time.  We did discuss that we need to determine whether her symptoms are due to a persistent infection which is not being treated fully, a different infection each time, or if she is having the symptoms of an infection but not actually bacteria in her urine.  She understands this.

## 2022-04-05 DIAGNOSIS — E11.69 TYPE 2 DIABETES MELLITUS WITH OTHER SPECIFIED COMPLICATION, WITHOUT LONG-TERM CURRENT USE OF INSULIN: ICD-10-CM

## 2022-04-05 DIAGNOSIS — E03.9 HYPOTHYROIDISM, UNSPECIFIED TYPE: ICD-10-CM

## 2022-04-05 DIAGNOSIS — I10 ESSENTIAL HYPERTENSION: Primary | ICD-10-CM

## 2022-05-24 ENCOUNTER — OFFICE VISIT (OUTPATIENT)
Dept: FAMILY MEDICINE CLINIC | Facility: CLINIC | Age: 62
End: 2022-05-24

## 2022-05-24 VITALS
RESPIRATION RATE: 16 BRPM | DIASTOLIC BLOOD PRESSURE: 88 MMHG | HEART RATE: 113 BPM | OXYGEN SATURATION: 98 % | HEIGHT: 66 IN | BODY MASS INDEX: 42.11 KG/M2 | WEIGHT: 262 LBS | TEMPERATURE: 97.4 F | SYSTOLIC BLOOD PRESSURE: 158 MMHG

## 2022-05-24 DIAGNOSIS — E11.69 TYPE 2 DIABETES MELLITUS WITH OTHER SPECIFIED COMPLICATION, WITHOUT LONG-TERM CURRENT USE OF INSULIN: Primary | ICD-10-CM

## 2022-05-24 DIAGNOSIS — R30.0 DYSURIA: ICD-10-CM

## 2022-05-24 DIAGNOSIS — E03.9 HYPOTHYROIDISM, UNSPECIFIED TYPE: ICD-10-CM

## 2022-05-24 DIAGNOSIS — I10 ESSENTIAL HYPERTENSION: ICD-10-CM

## 2022-05-24 LAB
ALBUMIN SERPL-MCNC: 4.1 G/DL (ref 3.5–5.2)
ALBUMIN/GLOB SERPL: 1.1 G/DL
ALP SERPL-CCNC: 90 U/L (ref 39–117)
ALT SERPL W P-5'-P-CCNC: 33 U/L (ref 1–33)
ANION GAP SERPL CALCULATED.3IONS-SCNC: 19.5 MMOL/L (ref 5–15)
AST SERPL-CCNC: 24 U/L (ref 1–32)
BASOPHILS # BLD AUTO: 0.07 10*3/MM3 (ref 0–0.2)
BASOPHILS NFR BLD AUTO: 0.4 % (ref 0–1.5)
BILIRUB BLD-MCNC: NEGATIVE MG/DL
BILIRUB SERPL-MCNC: 0.3 MG/DL (ref 0–1.2)
BUN SERPL-MCNC: 12 MG/DL (ref 8–23)
BUN/CREAT SERPL: 16.9 (ref 7–25)
CALCIUM SPEC-SCNC: 9.7 MG/DL (ref 8.6–10.5)
CHLORIDE SERPL-SCNC: 101 MMOL/L (ref 98–107)
CLARITY, POC: CLEAR
CO2 SERPL-SCNC: 19.5 MMOL/L (ref 22–29)
COLOR UR: YELLOW
CREAT SERPL-MCNC: 0.71 MG/DL (ref 0.57–1)
DEPRECATED RDW RBC AUTO: 39.8 FL (ref 37–54)
EGFRCR SERPLBLD CKD-EPI 2021: 96.9 ML/MIN/1.73
EOSINOPHIL # BLD AUTO: 0.18 10*3/MM3 (ref 0–0.4)
EOSINOPHIL NFR BLD AUTO: 1.2 % (ref 0.3–6.2)
ERYTHROCYTE [DISTWIDTH] IN BLOOD BY AUTOMATED COUNT: 13 % (ref 12.3–15.4)
GLOBULIN UR ELPH-MCNC: 3.7 GM/DL
GLUCOSE SERPL-MCNC: 193 MG/DL (ref 65–99)
GLUCOSE UR STRIP-MCNC: NEGATIVE MG/DL
HBA1C MFR BLD: 8.2 % (ref 4.8–5.6)
HCT VFR BLD AUTO: 43.6 % (ref 34–46.6)
HGB BLD-MCNC: 14.6 G/DL (ref 12–15.9)
IMM GRANULOCYTES # BLD AUTO: 0.12 10*3/MM3 (ref 0–0.05)
IMM GRANULOCYTES NFR BLD AUTO: 0.8 % (ref 0–0.5)
KETONES UR QL: ABNORMAL
LEUKOCYTE EST, POC: NEGATIVE
LYMPHOCYTES # BLD AUTO: 3.5 10*3/MM3 (ref 0.7–3.1)
LYMPHOCYTES NFR BLD AUTO: 22.4 % (ref 19.6–45.3)
MCH RBC QN AUTO: 28.8 PG (ref 26.6–33)
MCHC RBC AUTO-ENTMCNC: 33.5 G/DL (ref 31.5–35.7)
MCV RBC AUTO: 86 FL (ref 79–97)
MONOCYTES # BLD AUTO: 0.75 10*3/MM3 (ref 0.1–0.9)
MONOCYTES NFR BLD AUTO: 4.8 % (ref 5–12)
NEUTROPHILS NFR BLD AUTO: 11.02 10*3/MM3 (ref 1.7–7)
NEUTROPHILS NFR BLD AUTO: 70.4 % (ref 42.7–76)
NITRITE UR-MCNC: NEGATIVE MG/ML
NRBC BLD AUTO-RTO: 0 /100 WBC (ref 0–0.2)
PH UR: 5.5 [PH] (ref 5–8)
PLATELET # BLD AUTO: 338 10*3/MM3 (ref 140–450)
PMV BLD AUTO: 11.7 FL (ref 6–12)
POTASSIUM SERPL-SCNC: 3.7 MMOL/L (ref 3.5–5.2)
PROT SERPL-MCNC: 7.8 G/DL (ref 6–8.5)
PROT UR STRIP-MCNC: ABNORMAL MG/DL
RBC # BLD AUTO: 5.07 10*6/MM3 (ref 3.77–5.28)
RBC # UR STRIP: NEGATIVE /UL
SODIUM SERPL-SCNC: 140 MMOL/L (ref 136–145)
SP GR UR: 1.03 (ref 1–1.03)
TSH SERPL DL<=0.05 MIU/L-ACNC: 1.74 UIU/ML (ref 0.27–4.2)
UROBILINOGEN UR QL: NORMAL
WBC NRBC COR # BLD: 15.64 10*3/MM3 (ref 3.4–10.8)

## 2022-05-24 PROCEDURE — 87086 URINE CULTURE/COLONY COUNT: CPT | Performed by: FAMILY MEDICINE

## 2022-05-24 PROCEDURE — 83036 HEMOGLOBIN GLYCOSYLATED A1C: CPT | Performed by: FAMILY MEDICINE

## 2022-05-24 PROCEDURE — 84443 ASSAY THYROID STIM HORMONE: CPT | Performed by: FAMILY MEDICINE

## 2022-05-24 PROCEDURE — 85025 COMPLETE CBC W/AUTO DIFF WBC: CPT | Performed by: FAMILY MEDICINE

## 2022-05-24 PROCEDURE — 99214 OFFICE O/P EST MOD 30 MIN: CPT | Performed by: FAMILY MEDICINE

## 2022-05-24 PROCEDURE — 81002 URINALYSIS NONAUTO W/O SCOPE: CPT | Performed by: FAMILY MEDICINE

## 2022-05-24 PROCEDURE — 80053 COMPREHEN METABOLIC PANEL: CPT | Performed by: FAMILY MEDICINE

## 2022-05-24 RX ORDER — LISINOPRIL AND HYDROCHLOROTHIAZIDE 12.5; 1 MG/1; MG/1
1 TABLET ORAL DAILY
Qty: 30 TABLET | Refills: 11 | Status: SHIPPED | OUTPATIENT
Start: 2022-05-24 | End: 2022-06-23

## 2022-05-24 RX ORDER — LEVOTHYROXINE SODIUM 88 UG/1
88 TABLET ORAL
Qty: 30 TABLET | Refills: 11 | Status: SHIPPED | OUTPATIENT
Start: 2022-05-24 | End: 2022-06-23

## 2022-05-24 RX ORDER — UREA 10 %
1 LOTION (ML) TOPICAL
Qty: 90 TABLET | Refills: 3 | Status: SHIPPED | OUTPATIENT
Start: 2022-05-24 | End: 2022-05-24 | Stop reason: SDUPTHER

## 2022-05-24 RX ORDER — LISINOPRIL AND HYDROCHLOROTHIAZIDE 12.5; 1 MG/1; MG/1
1 TABLET ORAL DAILY
Qty: 90 TABLET | Refills: 3 | Status: SHIPPED | OUTPATIENT
Start: 2022-05-24 | End: 2022-05-24 | Stop reason: SDUPTHER

## 2022-05-24 RX ORDER — ESTRADIOL 1 MG/1
1 TABLET ORAL DAILY
Qty: 30 TABLET | Refills: 11 | Status: SHIPPED | OUTPATIENT
Start: 2022-05-24 | End: 2022-06-23

## 2022-05-24 RX ORDER — LEVOTHYROXINE SODIUM 88 UG/1
88 TABLET ORAL
Qty: 90 TABLET | Refills: 3 | Status: SHIPPED | OUTPATIENT
Start: 2022-05-24 | End: 2022-05-24 | Stop reason: SDUPTHER

## 2022-05-24 RX ORDER — UREA 10 %
1 LOTION (ML) TOPICAL
Qty: 30 TABLET | Refills: 11 | Status: SHIPPED | OUTPATIENT
Start: 2022-05-24 | End: 2022-06-23

## 2022-05-24 RX ORDER — ESTRADIOL 1 MG/1
1 TABLET ORAL DAILY
Qty: 90 TABLET | Refills: 3 | Status: SHIPPED | OUTPATIENT
Start: 2022-05-24 | End: 2022-05-24 | Stop reason: SDUPTHER

## 2022-05-25 LAB — BACTERIA SPEC AEROBE CULT: NO GROWTH

## 2022-05-26 DIAGNOSIS — E11.69 TYPE 2 DIABETES MELLITUS WITH OTHER SPECIFIED COMPLICATION, WITHOUT LONG-TERM CURRENT USE OF INSULIN: ICD-10-CM

## 2023-05-30 ENCOUNTER — TELEPHONE (OUTPATIENT)
Dept: FAMILY MEDICINE CLINIC | Facility: CLINIC | Age: 63
End: 2023-05-30

## 2023-05-30 DIAGNOSIS — E11.69 TYPE 2 DIABETES MELLITUS WITH OTHER SPECIFIED COMPLICATION, WITHOUT LONG-TERM CURRENT USE OF INSULIN: ICD-10-CM

## 2023-05-30 NOTE — TELEPHONE ENCOUNTER
Patient is stating that her Metformin she picked up is 500 and she needs it changed to 1000, which Dr. Woodson changed last year. Please call patient once this has been called in. [phone].

## 2023-07-24 ENCOUNTER — TRANSCRIBE ORDERS (OUTPATIENT)
Dept: ADMINISTRATIVE | Facility: HOSPITAL | Age: 63
End: 2023-07-24
Payer: COMMERCIAL

## 2023-07-24 DIAGNOSIS — Z12.31 ENCOUNTER FOR SCREENING MAMMOGRAM FOR BREAST CANCER: Primary | ICD-10-CM

## 2024-07-30 ENCOUNTER — OFFICE VISIT (OUTPATIENT)
Dept: FAMILY MEDICINE CLINIC | Facility: CLINIC | Age: 64
End: 2024-07-30
Payer: COMMERCIAL

## 2024-07-30 VITALS
TEMPERATURE: 98.1 F | WEIGHT: 247.9 LBS | DIASTOLIC BLOOD PRESSURE: 80 MMHG | OXYGEN SATURATION: 97 % | HEART RATE: 111 BPM | HEIGHT: 66 IN | RESPIRATION RATE: 18 BRPM | BODY MASS INDEX: 39.84 KG/M2 | SYSTOLIC BLOOD PRESSURE: 140 MMHG

## 2024-07-30 DIAGNOSIS — I10 ESSENTIAL HYPERTENSION: ICD-10-CM

## 2024-07-30 DIAGNOSIS — D47.2 MGUS (MONOCLONAL GAMMOPATHY OF UNKNOWN SIGNIFICANCE): Primary | ICD-10-CM

## 2024-07-30 DIAGNOSIS — E11.65 TYPE 2 DIABETES MELLITUS WITH HYPERGLYCEMIA, WITHOUT LONG-TERM CURRENT USE OF INSULIN: ICD-10-CM

## 2024-07-30 DIAGNOSIS — R79.89 ABNORMAL CBC: ICD-10-CM

## 2024-07-30 DIAGNOSIS — E03.9 HYPOTHYROIDISM, UNSPECIFIED TYPE: ICD-10-CM

## 2024-07-30 LAB
ALBUMIN SERPL-MCNC: 4.1 G/DL (ref 3.5–5.2)
ALBUMIN/GLOB SERPL: 1.2 G/DL
ALP SERPL-CCNC: 94 U/L (ref 39–117)
ALT SERPL W P-5'-P-CCNC: 29 U/L (ref 1–33)
ANION GAP SERPL CALCULATED.3IONS-SCNC: 15 MMOL/L (ref 5–15)
AST SERPL-CCNC: 26 U/L (ref 1–32)
BASOPHILS # BLD AUTO: 0.08 10*3/MM3 (ref 0–0.2)
BASOPHILS NFR BLD AUTO: 0.6 % (ref 0–1.5)
BILIRUB SERPL-MCNC: 0.2 MG/DL (ref 0–1.2)
BUN SERPL-MCNC: 15 MG/DL (ref 8–23)
BUN/CREAT SERPL: 17.6 (ref 7–25)
CALCIUM SPEC-SCNC: 10.3 MG/DL (ref 8.6–10.5)
CHLORIDE SERPL-SCNC: 102 MMOL/L (ref 98–107)
CHOLEST SERPL-MCNC: 199 MG/DL (ref 0–200)
CO2 SERPL-SCNC: 19 MMOL/L (ref 22–29)
CREAT SERPL-MCNC: 0.85 MG/DL (ref 0.57–1)
DEPRECATED RDW RBC AUTO: 42 FL (ref 37–54)
EGFRCR SERPLBLD CKD-EPI 2021: 76.6 ML/MIN/1.73
EOSINOPHIL # BLD AUTO: 0.18 10*3/MM3 (ref 0–0.4)
EOSINOPHIL NFR BLD AUTO: 1.3 % (ref 0.3–6.2)
ERYTHROCYTE [DISTWIDTH] IN BLOOD BY AUTOMATED COUNT: 13 % (ref 12.3–15.4)
GLOBULIN UR ELPH-MCNC: 3.3 GM/DL
GLUCOSE SERPL-MCNC: 225 MG/DL (ref 65–99)
HBA1C MFR BLD: 9 % (ref 4.8–5.6)
HCT VFR BLD AUTO: 44.4 % (ref 34–46.6)
HDLC SERPL-MCNC: 51 MG/DL (ref 40–60)
HGB BLD-MCNC: 14.6 G/DL (ref 12–15.9)
IMM GRANULOCYTES # BLD AUTO: 0.15 10*3/MM3 (ref 0–0.05)
IMM GRANULOCYTES NFR BLD AUTO: 1 % (ref 0–0.5)
LDLC SERPL CALC-MCNC: 125 MG/DL (ref 0–100)
LDLC/HDLC SERPL: 2.39 {RATIO}
LYMPHOCYTES # BLD AUTO: 3.27 10*3/MM3 (ref 0.7–3.1)
LYMPHOCYTES NFR BLD AUTO: 22.9 % (ref 19.6–45.3)
MCH RBC QN AUTO: 29.3 PG (ref 26.6–33)
MCHC RBC AUTO-ENTMCNC: 32.9 G/DL (ref 31.5–35.7)
MCV RBC AUTO: 89 FL (ref 79–97)
MONOCYTES # BLD AUTO: 0.79 10*3/MM3 (ref 0.1–0.9)
MONOCYTES NFR BLD AUTO: 5.5 % (ref 5–12)
NEUTROPHILS NFR BLD AUTO: 68.7 % (ref 42.7–76)
NEUTROPHILS NFR BLD AUTO: 9.83 10*3/MM3 (ref 1.7–7)
PLATELET # BLD AUTO: 333 10*3/MM3 (ref 140–450)
PMV BLD AUTO: 11.8 FL (ref 6–12)
POTASSIUM SERPL-SCNC: 4.1 MMOL/L (ref 3.5–5.2)
PROT SERPL-MCNC: 7.4 G/DL (ref 6–8.5)
RBC # BLD AUTO: 4.99 10*6/MM3 (ref 3.77–5.28)
SODIUM SERPL-SCNC: 136 MMOL/L (ref 136–145)
TRIGL SERPL-MCNC: 131 MG/DL (ref 0–150)
TSH SERPL DL<=0.05 MIU/L-ACNC: 2.24 UIU/ML (ref 0.27–4.2)
VLDLC SERPL-MCNC: 23 MG/DL (ref 5–40)
WBC NRBC COR # BLD AUTO: 14.3 10*3/MM3 (ref 3.4–10.8)

## 2024-07-30 PROCEDURE — 99214 OFFICE O/P EST MOD 30 MIN: CPT | Performed by: FAMILY MEDICINE

## 2024-07-30 PROCEDURE — 85025 COMPLETE CBC W/AUTO DIFF WBC: CPT | Performed by: FAMILY MEDICINE

## 2024-07-30 PROCEDURE — 80061 LIPID PANEL: CPT | Performed by: FAMILY MEDICINE

## 2024-07-30 PROCEDURE — 84443 ASSAY THYROID STIM HORMONE: CPT | Performed by: FAMILY MEDICINE

## 2024-07-30 PROCEDURE — 83036 HEMOGLOBIN GLYCOSYLATED A1C: CPT | Performed by: FAMILY MEDICINE

## 2024-07-30 PROCEDURE — 36415 COLL VENOUS BLD VENIPUNCTURE: CPT | Performed by: FAMILY MEDICINE

## 2024-07-30 PROCEDURE — 80053 COMPREHEN METABOLIC PANEL: CPT | Performed by: FAMILY MEDICINE

## 2024-07-30 RX ORDER — LEVOTHYROXINE SODIUM 88 UG/1
88 TABLET ORAL DAILY
Qty: 90 TABLET | Refills: 3 | Status: SHIPPED | OUTPATIENT
Start: 2024-07-30 | End: 2024-10-28

## 2024-07-30 RX ORDER — UREA 10 %
1 LOTION (ML) TOPICAL
Qty: 90 TABLET | Refills: 3 | Status: SHIPPED | OUTPATIENT
Start: 2024-07-30 | End: 2024-10-28

## 2024-07-30 RX ORDER — CLOBETASOL PROPIONATE 0.5 MG/G
OINTMENT TOPICAL 2 TIMES DAILY
Qty: 60 G | Refills: 3 | Status: SHIPPED | OUTPATIENT
Start: 2024-07-30 | End: 2024-08-13

## 2024-07-30 RX ORDER — ESTRADIOL 1 MG/1
1 TABLET ORAL DAILY
Qty: 90 TABLET | Refills: 3 | Status: SHIPPED | OUTPATIENT
Start: 2024-07-30 | End: 2024-10-28

## 2024-07-30 RX ORDER — LISINOPRIL AND HYDROCHLOROTHIAZIDE 12.5; 1 MG/1; MG/1
1 TABLET ORAL DAILY
Qty: 90 TABLET | Refills: 3 | Status: SHIPPED | OUTPATIENT
Start: 2024-07-30 | End: 2024-10-28

## 2024-07-30 RX ORDER — CLINDAMYCIN HYDROCHLORIDE 150 MG/1
150 CAPSULE ORAL 4 TIMES DAILY
Qty: 40 CAPSULE | Refills: 0 | Status: SHIPPED | OUTPATIENT
Start: 2024-07-30 | End: 2024-08-09

## 2024-07-30 NOTE — PROGRESS NOTES
Chief Complaint  Annual Exam (No other concerns /) and Labs Only    Subjective        Frida Huff presents to [facility]  History of Present Illness  The patient presents for annual checkup.    The patient, last seen a year ago, is here for a routine checkup. She reports experiencing a flare-up in the groin area, which is causing discomfort. The previous treatment provided relief for 2 years. The flare-up typically resolves spontaneously with antibiotics, however, the current flare-up has spread beneath the skin, causing redness and pain. This flare-up appears to occur externally, particularly in the groin area. This has been a recurring issue for 10 years. The patient also experiences similar cellulitis symptoms on the dorsal aspect of her feet and calves, particularly after prolonged footwear. She has been applying ice packs on her legs and groin for the past 2 days. Antibiotics induce nausea, and she takes them close to mealtimes. Despite adhering to intermittent fasting, she consumes only twice daily. Her current medications include metformin, thyroid medication, blood pressure medication, melatonin for sleep, and estradiol. She has previously consulted with Dr. Hogue for MGUS, but experienced an unpleasant experience. She has undergone a bone marrow biopsy in the past, but is hesitant due to her symptoms. She requires refills for all 5 medications. She attempted to increase her melatonin dosage to 2 mg, but experienced severe nightmares. Despite the use of melatonin, she continues to experience insomnia.    Past Medical History:   Diagnosis Date    Adhesive capsulitis of left shoulder 04/18/2018    Allergies     Anemia     Anxiety     Cancer Diagnosed MGUS    huh? thought this wasn't quite cancer yet?    Chest pain     Clotting disorder During Recurring UTI's    Colitis     Coronary artery disease Arrythmia    Deafness     Depression     Diabetes mellitus     Diabetes  mellitus, type 2 08/21/2019    Eating disorder 80's    young and stupid    Essential hypertension 08/21/2019    Hemorrhoids     Hypertension     Hyperthyroidism 08/21/2019    Hypothyroidism 2002    might want to correct these records if hypertyroid was listed for some odd reason.    Kidney stones     MGUS (monoclonal gammopathy of unknown significance) 08/21/2019    Migraines     Night sweats     Numbness 05/15/2018    Pt reports numbness in lt upper extremity & 2012. Since that time, she has had intermittent, more frequent and not, numbness in the lt upeer extremity with radiation into the fourth and fifth digits of the lt hand. In addition she reports knumbness of the lt face. I did not personally review her MRI of the brain    Obesity 2000    bit on the chunky side    Sinus trouble     SOB (shortness of breath)     Thyroid disorder     Urinary tract infection Often. 4-5 yearly      Family History   Problem Relation Age of Onset    Heart disease Mother     Cancer Mother     Arthritis Mother         self explanatory    Stroke Father     Diabetes Father         Diabetic    Arthritis Father         didn't know him    Alcohol abuse Father     Diabetes Sister         Diabetic    Stroke Brother         Diabetic, hearing loss, blind    Heart disease Brother     Diabetes Brother     Diabetes Brother         Diabetic, heart issues, kidney cancer, bladder cancer      Social History     Socioeconomic History    Marital status:    Tobacco Use    Smoking status: Never     Passive exposure: Never    Smokeless tobacco: Never    Tobacco comments:     Tried it. Didn't like it.   Vaping Use    Vaping status: Never Used   Substance and Sexual Activity    Alcohol use: Not Currently     Comment: Social drinker when younger. Haven't imbibed in over 2 decad    Drug use: Never    Sexual activity: Not Currently     Partners: Male     Birth control/protection: Abstinence, Hysterectomy      Objective   Vital Signs:  /80 (BP  "Location: Left arm, Patient Position: Sitting, Cuff Size: Adult)   Pulse 111   Temp 98.1 °F (36.7 °C) (Oral)   Resp 18   Ht 167.6 cm (66\")   Wt 112 kg (247 lb 14.4 oz)   SpO2 97%   BMI 40.01 kg/m²   Estimated body mass index is 40.01 kg/m² as calculated from the following:    Height as of this encounter: 167.6 cm (66\").    Weight as of this encounter: 112 kg (247 lb 14.4 oz).     Class 3 Severe Obesity (BMI >=40). Obesity-related health conditions include the following: diabetes mellitus. Obesity is improving with lifestyle modifications. BMI is is above average; BMI management plan is completed. We discussed low calorie, low carb based diet program, portion control, and increasing exercise.    Review of Systems   Constitutional:  Negative for activity change, chills, fatigue and fever.   HENT:  Negative for congestion, sinus pressure, sinus pain and sore throat.    Eyes:  Negative for discharge and redness.   Respiratory:  Negative for cough and chest tightness.    Cardiovascular:  Negative for chest pain, palpitations and leg swelling.   Gastrointestinal:  Negative for abdominal pain and diarrhea.   Endocrine: Negative for cold intolerance and heat intolerance.   Genitourinary:  Negative for difficulty urinating and dysuria.   Musculoskeletal:  Positive for myalgias. Negative for gait problem and neck stiffness.   Skin:  Negative for pallor and rash.   Neurological:  Negative for dizziness and headaches.   Psychiatric/Behavioral:  Negative for agitation, behavioral problems and confusion.       Physical Exam  Heart shows no arrhythmias.    Physical Exam  Vitals reviewed.   Constitutional:       Appearance: Normal appearance.   HENT:      Right Ear: Tympanic membrane normal.      Left Ear: Tympanic membrane normal.      Nose: Nose normal.   Eyes:      Extraocular Movements: Extraocular movements intact.      Conjunctiva/sclera: Conjunctivae normal.      Pupils: Pupils are equal, round, and reactive to light. "   Cardiovascular:      Rate and Rhythm: Normal rate and regular rhythm.   Pulmonary:      Effort: Pulmonary effort is normal.      Breath sounds: Normal breath sounds.   Abdominal:      General: Bowel sounds are normal.   Musculoskeletal:         General: Normal range of motion.      Cervical back: Normal range of motion.   Skin:     General: Skin is warm and dry.   Neurological:      General: No focal deficit present.      Mental Status: She is alert and oriented to person, place, and time.   Psychiatric:         Mood and Affect: Mood normal.         Behavior: Behavior normal.        Result Review       Results               Assessment and Plan    Diagnoses and all orders for this visit:    1. MGUS (monoclonal gammopathy of unknown significance) (Primary)  -     Comprehensive Metabolic Panel    2. Type 2 diabetes mellitus with hyperglycemia, without long-term current use of insulin  -     Hemoglobin A1c  -     TSH  -     Lipid Panel  -     metFORMIN (GLUCOPHAGE) 1000 MG tablet; Take 1 tablet by mouth 2 (Two) Times a Day With Meals for 90 days.  Dispense: 180 tablet; Refill: 3    3. Abnormal CBC  -     CBC Auto Differential    4. Essential hypertension  -     lisinopril-hydrochlorothiazide (PRINZIDE,ZESTORETIC) 10-12.5 MG per tablet; Take 1 tablet by mouth Daily for 90 days.  Dispense: 90 tablet; Refill: 3    5. Hypothyroidism, unspecified type  -     levothyroxine (SYNTHROID, LEVOTHROID) 88 MCG tablet; Take 1 tablet by mouth Daily for 90 days.  Dispense: 90 tablet; Refill: 3    Other orders  -     estradiol (ESTRACE) 1 MG tablet; Take 1 tablet by mouth Daily for 90 days.  Dispense: 90 tablet; Refill: 3  -     melatonin 1 MG tablet; Take 1 tablet by mouth every night at bedtime for 90 days.  Dispense: 90 tablet; Refill: 3  -     clobetasol (TEMOVATE) 0.05 % ointment; Apply  topically to the appropriate area as directed 2 (Two) Times a Day for 14 days.  Dispense: 60 g; Refill: 3  -     clindamycin (Cleocin) 150 MG  capsule; Take 1 capsule by mouth 4 (Four) Times a Day for 10 days.  Dispense: 40 capsule; Refill: 0      Assessment & Plan  1. Routine checkup.  Prescriptions for clindamycin, lisinopril, metformin, melatonin, baby aspirin, and clobetasol ointment have been refilled. Laboratory tests have been ordered.    Follow-up  A follow-up visit is scheduled for 6 months from now.       I spent 35 minutes caring for Frida on this date of service. This time includes time spent by me in the following activities:reviewing tests  Follow Up   Return in about 6 months (around 1/30/2025).  Patient was given instructions and counseling regarding her condition or for health maintenance advice. Please see specific information pulled into the AVS if appropriate.   Patient or patient representative verbalized consent for the use of Ambient Listening during the visit with  Edel Woodson MD for chart documentation. 7/30/2024  16:08 EDT    Edel Woodson MD       no

## 2024-07-31 DIAGNOSIS — E11.65 TYPE 2 DIABETES MELLITUS WITH HYPERGLYCEMIA, WITHOUT LONG-TERM CURRENT USE OF INSULIN: Primary | ICD-10-CM

## 2025-05-20 ENCOUNTER — PATIENT MESSAGE (OUTPATIENT)
Dept: FAMILY MEDICINE CLINIC | Facility: CLINIC | Age: 65
End: 2025-05-20
Payer: COMMERCIAL

## 2025-05-20 DIAGNOSIS — R30.0 DYSURIA: Primary | ICD-10-CM

## 2025-08-05 ENCOUNTER — OFFICE VISIT (OUTPATIENT)
Dept: FAMILY MEDICINE CLINIC | Facility: CLINIC | Age: 65
End: 2025-08-05
Payer: COMMERCIAL

## 2025-08-05 VITALS
DIASTOLIC BLOOD PRESSURE: 74 MMHG | OXYGEN SATURATION: 97 % | HEIGHT: 66 IN | SYSTOLIC BLOOD PRESSURE: 130 MMHG | WEIGHT: 236 LBS | BODY MASS INDEX: 37.93 KG/M2 | TEMPERATURE: 98.8 F | HEART RATE: 94 BPM

## 2025-08-05 DIAGNOSIS — R26.0 ATAXIC GAIT: ICD-10-CM

## 2025-08-05 DIAGNOSIS — I10 ESSENTIAL HYPERTENSION: ICD-10-CM

## 2025-08-05 DIAGNOSIS — R55 SYNCOPE, UNSPECIFIED SYNCOPE TYPE: ICD-10-CM

## 2025-08-05 DIAGNOSIS — E11.65 TYPE 2 DIABETES MELLITUS WITH HYPERGLYCEMIA, WITHOUT LONG-TERM CURRENT USE OF INSULIN: ICD-10-CM

## 2025-08-05 DIAGNOSIS — J01.00 SUBACUTE MAXILLARY SINUSITIS: ICD-10-CM

## 2025-08-05 DIAGNOSIS — E03.9 HYPOTHYROIDISM, UNSPECIFIED TYPE: ICD-10-CM

## 2025-08-05 DIAGNOSIS — R79.89 ABNORMAL CBC: ICD-10-CM

## 2025-08-05 DIAGNOSIS — E11.65 TYPE 2 DIABETES MELLITUS WITH HYPERGLYCEMIA, WITHOUT LONG-TERM CURRENT USE OF INSULIN: Primary | ICD-10-CM

## 2025-08-05 DIAGNOSIS — D47.2 MGUS (MONOCLONAL GAMMOPATHY OF UNKNOWN SIGNIFICANCE): ICD-10-CM

## 2025-08-05 DIAGNOSIS — Z00.00 ANNUAL PHYSICAL EXAM: ICD-10-CM

## 2025-08-05 LAB
ALBUMIN UR-MCNC: 1.9 MG/DL
BASOPHILS # BLD AUTO: 0.07 10*3/MM3 (ref 0–0.2)
BASOPHILS NFR BLD AUTO: 0.5 % (ref 0–1.5)
BILIRUB BLD-MCNC: ABNORMAL MG/DL
CLARITY, POC: CLEAR
COLOR UR: ABNORMAL
CREAT UR-MCNC: 211.3 MG/DL
DEPRECATED RDW RBC AUTO: 43.7 FL (ref 37–54)
EOSINOPHIL # BLD AUTO: 0.2 10*3/MM3 (ref 0–0.4)
EOSINOPHIL NFR BLD AUTO: 1.4 % (ref 0.3–6.2)
ERYTHROCYTE [DISTWIDTH] IN BLOOD BY AUTOMATED COUNT: 13.1 % (ref 12.3–15.4)
GLUCOSE UR STRIP-MCNC: NEGATIVE MG/DL
HBA1C MFR BLD: 8.6 % (ref 4.8–5.6)
HCT VFR BLD AUTO: 46.5 % (ref 34–46.6)
HGB BLD-MCNC: 14.5 G/DL (ref 12–15.9)
IMM GRANULOCYTES # BLD AUTO: 0.11 10*3/MM3 (ref 0–0.05)
IMM GRANULOCYTES NFR BLD AUTO: 0.8 % (ref 0–0.5)
KETONES UR QL: ABNORMAL
LEUKOCYTE EST, POC: NEGATIVE
LYMPHOCYTES # BLD AUTO: 3.97 10*3/MM3 (ref 0.7–3.1)
LYMPHOCYTES NFR BLD AUTO: 28.6 % (ref 19.6–45.3)
MCH RBC QN AUTO: 28.2 PG (ref 26.6–33)
MCHC RBC AUTO-ENTMCNC: 31.2 G/DL (ref 31.5–35.7)
MCV RBC AUTO: 90.3 FL (ref 79–97)
MICROALBUMIN/CREAT UR: 9 MG/G (ref 0–29)
MONOCYTES # BLD AUTO: 0.65 10*3/MM3 (ref 0.1–0.9)
MONOCYTES NFR BLD AUTO: 4.7 % (ref 5–12)
NEUTROPHILS NFR BLD AUTO: 64 % (ref 42.7–76)
NEUTROPHILS NFR BLD AUTO: 8.88 10*3/MM3 (ref 1.7–7)
NITRITE UR-MCNC: NEGATIVE MG/ML
NRBC BLD AUTO-RTO: 0 /100 WBC (ref 0–0.2)
PH UR: 5.5 [PH] (ref 5–8)
PLATELET # BLD AUTO: 390 10*3/MM3 (ref 140–450)
PMV BLD AUTO: 11.5 FL (ref 6–12)
PROT UR STRIP-MCNC: ABNORMAL MG/DL
RBC # BLD AUTO: 5.15 10*6/MM3 (ref 3.77–5.28)
RBC # UR STRIP: NEGATIVE /UL
SP GR UR: 1.03 (ref 1–1.03)
TSH SERPL DL<=0.05 MIU/L-ACNC: 2.45 UIU/ML (ref 0.27–4.2)
UROBILINOGEN UR QL: ABNORMAL
WBC NRBC COR # BLD AUTO: 13.88 10*3/MM3 (ref 3.4–10.8)

## 2025-08-05 PROCEDURE — 84443 ASSAY THYROID STIM HORMONE: CPT | Performed by: FAMILY MEDICINE

## 2025-08-05 PROCEDURE — 85025 COMPLETE CBC W/AUTO DIFF WBC: CPT | Performed by: FAMILY MEDICINE

## 2025-08-05 PROCEDURE — 87086 URINE CULTURE/COLONY COUNT: CPT | Performed by: FAMILY MEDICINE

## 2025-08-05 PROCEDURE — 82570 ASSAY OF URINE CREATININE: CPT | Performed by: FAMILY MEDICINE

## 2025-08-05 PROCEDURE — 82043 UR ALBUMIN QUANTITATIVE: CPT | Performed by: FAMILY MEDICINE

## 2025-08-05 PROCEDURE — 83036 HEMOGLOBIN GLYCOSYLATED A1C: CPT | Performed by: FAMILY MEDICINE

## 2025-08-05 PROCEDURE — 80053 COMPREHEN METABOLIC PANEL: CPT | Performed by: FAMILY MEDICINE

## 2025-08-05 RX ORDER — SAXAGLIPTIN 5 MG/1
5 TABLET, FILM COATED ORAL DAILY
Qty: 90 TABLET | Refills: 1 | Status: SHIPPED | OUTPATIENT
Start: 2025-08-05 | End: 2025-11-03

## 2025-08-05 RX ORDER — LEVOTHYROXINE SODIUM 88 UG/1
88 TABLET ORAL DAILY
Qty: 90 TABLET | Refills: 3 | Status: SHIPPED | OUTPATIENT
Start: 2025-08-05 | End: 2025-11-03

## 2025-08-06 DIAGNOSIS — E11.65 TYPE 2 DIABETES MELLITUS WITH HYPERGLYCEMIA, WITHOUT LONG-TERM CURRENT USE OF INSULIN: ICD-10-CM

## 2025-08-06 DIAGNOSIS — I10 ESSENTIAL HYPERTENSION: ICD-10-CM

## 2025-08-06 LAB
ALBUMIN SERPL-MCNC: 4.3 G/DL (ref 3.5–5.2)
ALBUMIN/GLOB SERPL: 1.3 G/DL
ALP SERPL-CCNC: 85 U/L (ref 39–117)
ALT SERPL W P-5'-P-CCNC: 34 U/L (ref 1–33)
ANION GAP SERPL CALCULATED.3IONS-SCNC: 18.3 MMOL/L (ref 5–15)
AST SERPL-CCNC: 47 U/L (ref 1–32)
BACTERIA SPEC AEROBE CULT: NORMAL
BILIRUB SERPL-MCNC: 0.3 MG/DL (ref 0–1.2)
BUN SERPL-MCNC: 11 MG/DL (ref 8–23)
BUN/CREAT SERPL: 9.7 (ref 7–25)
CALCIUM SPEC-SCNC: 10.3 MG/DL (ref 8.6–10.5)
CHLORIDE SERPL-SCNC: 100 MMOL/L (ref 98–107)
CO2 SERPL-SCNC: 21.7 MMOL/L (ref 22–29)
CREAT SERPL-MCNC: 1.13 MG/DL (ref 0.57–1)
EGFRCR SERPLBLD CKD-EPI 2021: 54.1 ML/MIN/1.73
GLOBULIN UR ELPH-MCNC: 3.4 GM/DL
GLUCOSE SERPL-MCNC: 166 MG/DL (ref 65–99)
POTASSIUM SERPL-SCNC: 4.3 MMOL/L (ref 3.5–5.2)
PROT SERPL-MCNC: 7.7 G/DL (ref 6–8.5)
SODIUM SERPL-SCNC: 140 MMOL/L (ref 136–145)

## 2025-08-07 RX ORDER — ESTRADIOL 1 MG/1
1 TABLET ORAL DAILY
Qty: 30 TABLET | Refills: 0 | Status: SHIPPED | OUTPATIENT
Start: 2025-08-07

## 2025-08-07 RX ORDER — LISINOPRIL AND HYDROCHLOROTHIAZIDE 10; 12.5 MG/1; MG/1
1 TABLET ORAL DAILY
Qty: 90 TABLET | Refills: 3 | OUTPATIENT
Start: 2025-08-07 | End: 2025-11-05

## 2025-08-07 RX ORDER — LISINOPRIL AND HYDROCHLOROTHIAZIDE 10; 12.5 MG/1; MG/1
1 TABLET ORAL DAILY
Qty: 90 TABLET | Refills: 1 | Status: SHIPPED | OUTPATIENT
Start: 2025-08-07

## 2025-08-07 RX ORDER — ESTRADIOL 1 MG/1
1 TABLET ORAL DAILY
Qty: 90 TABLET | Refills: 3 | OUTPATIENT
Start: 2025-08-07 | End: 2025-11-05

## 2025-08-21 RX ORDER — ESTRADIOL 1 MG/1
1 TABLET ORAL DAILY
Qty: 90 TABLET | Refills: 1 | Status: SHIPPED | OUTPATIENT
Start: 2025-08-21